# Patient Record
Sex: MALE | Race: BLACK OR AFRICAN AMERICAN | NOT HISPANIC OR LATINO | Employment: OTHER | URBAN - METROPOLITAN AREA
[De-identification: names, ages, dates, MRNs, and addresses within clinical notes are randomized per-mention and may not be internally consistent; named-entity substitution may affect disease eponyms.]

---

## 2017-12-29 LAB
INFLUENZA A ANTIGEN, POC: POSITIVE
INFLUENZA B ANTIGEN, POC: NEGATIVE
RAPID GROUP A STREP (OHS): POSITIVE

## 2021-05-14 ENCOUNTER — HISTORICAL (OUTPATIENT)
Dept: ADMINISTRATIVE | Facility: HOSPITAL | Age: 61
End: 2021-05-14

## 2021-05-14 LAB — SARS-COV-2 RNA RESP QL NAA+PROBE: NOT DETECTED

## 2022-04-11 ENCOUNTER — HISTORICAL (OUTPATIENT)
Dept: ADMINISTRATIVE | Facility: HOSPITAL | Age: 62
End: 2022-04-11

## 2022-04-24 VITALS
DIASTOLIC BLOOD PRESSURE: 101 MMHG | BODY MASS INDEX: 32.82 KG/M2 | WEIGHT: 229.25 LBS | SYSTOLIC BLOOD PRESSURE: 195 MMHG | OXYGEN SATURATION: 100 % | HEIGHT: 70 IN

## 2022-05-11 ENCOUNTER — TELEPHONE (OUTPATIENT)
Dept: PRIMARY CARE CLINIC | Facility: CLINIC | Age: 62
End: 2022-05-11
Payer: COMMERCIAL

## 2022-05-11 LAB — CRC RECOMMENDATION EXT: NORMAL

## 2022-05-11 NOTE — TELEPHONE ENCOUNTER
----- Message from Beverly Benitez sent at 5/11/2022  2:17 PM CDT -----  Regarding: Advice  Type:  Needs Medical Advice    Who Called: Patient  Symptoms (please be specific): Rash on right side of arm and neck  How long has patient had these symptoms:  5 days  Pharmacy name and phone #:  [facility] on [address]  Would the patient rather a call back or a response via [facility]? Call  Best Call Back Number: [phone]  Additional Information: Patient had a virtual visit on yesterday in which he was told the rash looked a lot like Shingles. A topical cream (triamcinolone .025%) was prescribed and started yesterday. Today the rash is the same and would like to let Dr Flowers know what's going on. He was told to follow up with his PCP. Please Advise.

## 2022-05-12 ENCOUNTER — PATIENT MESSAGE (OUTPATIENT)
Dept: ADMINISTRATIVE | Facility: HOSPITAL | Age: 62
End: 2022-05-12
Payer: COMMERCIAL

## 2022-05-12 ENCOUNTER — PATIENT OUTREACH (OUTPATIENT)
Dept: ADMINISTRATIVE | Facility: HOSPITAL | Age: 62
End: 2022-05-12
Payer: COMMERCIAL

## 2022-05-12 ENCOUNTER — OFFICE VISIT (OUTPATIENT)
Dept: URGENT CARE | Facility: CLINIC | Age: 62
End: 2022-05-12
Payer: COMMERCIAL

## 2022-05-12 VITALS
OXYGEN SATURATION: 98 % | RESPIRATION RATE: 20 BRPM | HEIGHT: 70 IN | WEIGHT: 220 LBS | HEART RATE: 56 BPM | BODY MASS INDEX: 31.5 KG/M2 | DIASTOLIC BLOOD PRESSURE: 79 MMHG | SYSTOLIC BLOOD PRESSURE: 163 MMHG | TEMPERATURE: 98 F

## 2022-05-12 DIAGNOSIS — B02.9 HERPES ZOSTER WITHOUT COMPLICATION: ICD-10-CM

## 2022-05-12 PROBLEM — L25.9 CONTACT DERMATITIS: Status: ACTIVE | Noted: 2022-05-12

## 2022-05-12 PROBLEM — L25.9 CONTACT DERMATITIS: Status: RESOLVED | Noted: 2022-05-12 | Resolved: 2022-05-12

## 2022-05-12 PROCEDURE — 3008F PR BODY MASS INDEX (BMI) DOCUMENTED: ICD-10-PCS | Mod: CPTII,,, | Performed by: NURSE PRACTITIONER

## 2022-05-12 PROCEDURE — 1160F RVW MEDS BY RX/DR IN RCRD: CPT | Mod: CPTII,,, | Performed by: NURSE PRACTITIONER

## 2022-05-12 PROCEDURE — 3008F BODY MASS INDEX DOCD: CPT | Mod: CPTII,,, | Performed by: NURSE PRACTITIONER

## 2022-05-12 PROCEDURE — 99213 PR OFFICE/OUTPT VISIT, EST, LEVL III, 20-29 MIN: ICD-10-PCS | Mod: 25,,, | Performed by: NURSE PRACTITIONER

## 2022-05-12 PROCEDURE — 4010F PR ACE/ARB THEARPY RXD/TAKEN: ICD-10-PCS | Mod: CPTII,,, | Performed by: NURSE PRACTITIONER

## 2022-05-12 PROCEDURE — 4010F ACE/ARB THERAPY RXD/TAKEN: CPT | Mod: CPTII,,, | Performed by: NURSE PRACTITIONER

## 2022-05-12 PROCEDURE — 1159F MED LIST DOCD IN RCRD: CPT | Mod: CPTII,,, | Performed by: NURSE PRACTITIONER

## 2022-05-12 PROCEDURE — 3078F DIAST BP <80 MM HG: CPT | Mod: CPTII,,, | Performed by: NURSE PRACTITIONER

## 2022-05-12 PROCEDURE — 3077F PR MOST RECENT SYSTOLIC BLOOD PRESSURE >= 140 MM HG: ICD-10-PCS | Mod: CPTII,,, | Performed by: NURSE PRACTITIONER

## 2022-05-12 PROCEDURE — 99213 OFFICE O/P EST LOW 20 MIN: CPT | Mod: 25,,, | Performed by: NURSE PRACTITIONER

## 2022-05-12 PROCEDURE — 96372 THER/PROPH/DIAG INJ SC/IM: CPT | Mod: ,,, | Performed by: NURSE PRACTITIONER

## 2022-05-12 PROCEDURE — 1160F PR REVIEW ALL MEDS BY PRESCRIBER/CLIN PHARMACIST DOCUMENTED: ICD-10-PCS | Mod: CPTII,,, | Performed by: NURSE PRACTITIONER

## 2022-05-12 PROCEDURE — 3077F SYST BP >= 140 MM HG: CPT | Mod: CPTII,,, | Performed by: NURSE PRACTITIONER

## 2022-05-12 PROCEDURE — 1159F PR MEDICATION LIST DOCUMENTED IN MEDICAL RECORD: ICD-10-PCS | Mod: CPTII,,, | Performed by: NURSE PRACTITIONER

## 2022-05-12 PROCEDURE — 3078F PR MOST RECENT DIASTOLIC BLOOD PRESSURE < 80 MM HG: ICD-10-PCS | Mod: CPTII,,, | Performed by: NURSE PRACTITIONER

## 2022-05-12 PROCEDURE — 96372 PR INJECTION,THERAP/PROPH/DIAG2ST, IM OR SUBCUT: ICD-10-PCS | Mod: ,,, | Performed by: NURSE PRACTITIONER

## 2022-05-12 RX ORDER — LISINOPRIL AND HYDROCHLOROTHIAZIDE 12.5; 2 MG/1; MG/1
1 TABLET ORAL DAILY
COMMUNITY
Start: 2022-03-21 | End: 2023-12-08 | Stop reason: SDUPTHER

## 2022-05-12 RX ORDER — BETAMETHASONE SODIUM PHOSPHATE AND BETAMETHASONE ACETATE 3; 3 MG/ML; MG/ML
6 INJECTION, SUSPENSION INTRA-ARTICULAR; INTRALESIONAL; INTRAMUSCULAR; SOFT TISSUE
Status: COMPLETED | OUTPATIENT
Start: 2022-05-12 | End: 2022-05-12

## 2022-05-12 RX ORDER — VALACYCLOVIR HYDROCHLORIDE 1 G/1
1000 TABLET, FILM COATED ORAL 2 TIMES DAILY
Qty: 14 TABLET | Refills: 0 | Status: SHIPPED | OUTPATIENT
Start: 2022-05-12 | End: 2022-12-07

## 2022-05-12 RX ORDER — TRIAMCINOLONE ACETONIDE 0.25 MG/G
CREAM TOPICAL
COMMUNITY
Start: 2022-05-10 | End: 2023-04-19

## 2022-05-12 RX ADMIN — BETAMETHASONE SODIUM PHOSPHATE AND BETAMETHASONE ACETATE 6 MG: 3; 3 INJECTION, SUSPENSION INTRA-ARTICULAR; INTRALESIONAL; INTRAMUSCULAR; SOFT TISSUE at 04:05

## 2022-05-12 NOTE — PROGRESS NOTES
Population Health Outreach.Records Received, hyper-linked into chart at this time. The following record(s)  below were uploaded for Health Maintenance .    05.11.2022-COLONOSCOPY

## 2022-05-12 NOTE — PROGRESS NOTES
"Subjective:       Patient ID: Hernando Will is a 62 y.o. male.    Vitals:  height is 5' 10" (1.778 m) and weight is 99.8 kg (220 lb). His oral temperature is 98.1 °F (36.7 °C). His blood pressure is 163/79 (abnormal) and his pulse is 56 (abnormal). His respiration is 20 and oxygen saturation is 98%.     Chief Complaint: Rash (Rash on right arm and shoulder x 1 week)    Rash with burning on right arm and shoulder, x 1 week.  States has had a telehealth visit a few days ago, currently using triamcinolone cream.  Increasing pain to skin rash.    Rash  This is a new problem. The current episode started in the past 7 days. The problem has been gradually worsening since onset. The affected locations include the right arm and right shoulder. The rash is characterized by burning. He was exposed to nothing. Past treatments include anti-itch cream and topical steroids. The treatment provided mild relief.       Skin: Positive for rash (painful).       Objective:      Physical Exam   Constitutional: He is oriented to person, place, and time. He appears well-developed. He is cooperative.  Non-toxic appearance. He does not appear ill. No distress.   HENT:   Head: Normocephalic and atraumatic.   Ears:   Right Ear: Hearing, tympanic membrane, external ear and ear canal normal.   Left Ear: Hearing, tympanic membrane, external ear and ear canal normal.   Nose: Nose normal. No mucosal edema, rhinorrhea or nasal deformity. No epistaxis. Right sinus exhibits no maxillary sinus tenderness and no frontal sinus tenderness. Left sinus exhibits no maxillary sinus tenderness and no frontal sinus tenderness.   Mouth/Throat: Uvula is midline, oropharynx is clear and moist and mucous membranes are normal. No trismus in the jaw. Normal dentition. No uvula swelling. No posterior oropharyngeal erythema.   Eyes: Conjunctivae and lids are normal. Right eye exhibits no discharge. Left eye exhibits no discharge. No scleral icterus.   Neck: Trachea " normal and phonation normal. Neck supple.   Cardiovascular: Normal rate, regular rhythm, normal heart sounds and normal pulses.   Pulmonary/Chest: Effort normal and breath sounds normal. No respiratory distress.   Abdominal: Normal appearance and bowel sounds are normal. He exhibits no distension and no mass. Soft. There is no abdominal tenderness.   Musculoskeletal: Normal range of motion.         General: No deformity. Normal range of motion.   Neurological: He is alert and oriented to person, place, and time. He exhibits normal muscle tone. Coordination normal.   Skin: Skin is warm, dry, intact, not diaphoretic, not pale, rash, pustular, vesicular and no abscessed.        Psychiatric: His speech is normal and behavior is normal. Judgment and thought content normal.   Nursing note and vitals reviewed.        Assessment:       1. Herpes zoster without complication          Plan:       Coolness to skin.  Benadryl OTC orally  for itching, continue using your recently prescribed a prescription topical to skin, take prescription medication as directed Valtrex as instructed.  Follow-up with your primary care provider or return here for concerns.  Herpes zoster without complication

## 2022-05-12 NOTE — PATIENT INSTRUCTIONS
Continue using topical triamcinolone cream as directed.  May use Benadryl OTC   for itching as directed.  Complete prescription Valtrex as directed.  Follow-up with your primary care provider if symptoms worsen or persist

## 2022-05-20 ENCOUNTER — TELEPHONE (OUTPATIENT)
Dept: PRIMARY CARE CLINIC | Facility: CLINIC | Age: 62
End: 2022-05-20
Payer: COMMERCIAL

## 2022-05-20 NOTE — TELEPHONE ENCOUNTER
Please call and check on patient. If he is still having symptoms, try to schedule him for an appointment. Thank You!

## 2022-06-01 ENCOUNTER — HOSPITAL ENCOUNTER (OUTPATIENT)
Dept: RADIOLOGY | Facility: CLINIC | Age: 62
Discharge: HOME | End: 2022-06-01
Attending: SPECIALIST
Payer: COMMERCIAL

## 2022-06-01 ENCOUNTER — OFFICE VISIT (OUTPATIENT)
Dept: ORTHOPEDICS | Facility: CLINIC | Age: 62
End: 2022-06-01
Payer: COMMERCIAL

## 2022-06-01 VITALS
HEIGHT: 70 IN | BODY MASS INDEX: 31.5 KG/M2 | SYSTOLIC BLOOD PRESSURE: 179 MMHG | HEART RATE: 52 BPM | DIASTOLIC BLOOD PRESSURE: 82 MMHG | WEIGHT: 220 LBS

## 2022-06-01 DIAGNOSIS — M25.562 LEFT KNEE PAIN, UNSPECIFIED CHRONICITY: ICD-10-CM

## 2022-06-01 DIAGNOSIS — M17.12 PRIMARY OSTEOARTHRITIS OF LEFT KNEE: Primary | ICD-10-CM

## 2022-06-01 PROCEDURE — 3077F SYST BP >= 140 MM HG: CPT | Mod: CPTII,,, | Performed by: PHYSICIAN ASSISTANT

## 2022-06-01 PROCEDURE — 3079F PR MOST RECENT DIASTOLIC BLOOD PRESSURE 80-89 MM HG: ICD-10-PCS | Mod: CPTII,,, | Performed by: PHYSICIAN ASSISTANT

## 2022-06-01 PROCEDURE — 1159F MED LIST DOCD IN RCRD: CPT | Mod: CPTII,,, | Performed by: PHYSICIAN ASSISTANT

## 2022-06-01 PROCEDURE — 3008F PR BODY MASS INDEX (BMI) DOCUMENTED: ICD-10-PCS | Mod: CPTII,,, | Performed by: PHYSICIAN ASSISTANT

## 2022-06-01 PROCEDURE — 1160F RVW MEDS BY RX/DR IN RCRD: CPT | Mod: CPTII,,, | Performed by: PHYSICIAN ASSISTANT

## 2022-06-01 PROCEDURE — 3079F DIAST BP 80-89 MM HG: CPT | Mod: CPTII,,, | Performed by: PHYSICIAN ASSISTANT

## 2022-06-01 PROCEDURE — 99202 OFFICE O/P NEW SF 15 MIN: CPT | Mod: ,,, | Performed by: PHYSICIAN ASSISTANT

## 2022-06-01 PROCEDURE — 1160F PR REVIEW ALL MEDS BY PRESCRIBER/CLIN PHARMACIST DOCUMENTED: ICD-10-PCS | Mod: CPTII,,, | Performed by: PHYSICIAN ASSISTANT

## 2022-06-01 PROCEDURE — 3008F BODY MASS INDEX DOCD: CPT | Mod: CPTII,,, | Performed by: PHYSICIAN ASSISTANT

## 2022-06-01 PROCEDURE — 73564 X-RAY EXAM KNEE 4 OR MORE: CPT | Mod: LT,,, | Performed by: SPECIALIST

## 2022-06-01 PROCEDURE — 73564 XR KNEE COMP 4 OR MORE VIEWS LEFT: ICD-10-PCS | Mod: LT,,, | Performed by: SPECIALIST

## 2022-06-01 PROCEDURE — 4010F PR ACE/ARB THEARPY RXD/TAKEN: ICD-10-PCS | Mod: CPTII,,, | Performed by: PHYSICIAN ASSISTANT

## 2022-06-01 PROCEDURE — 3077F PR MOST RECENT SYSTOLIC BLOOD PRESSURE >= 140 MM HG: ICD-10-PCS | Mod: CPTII,,, | Performed by: PHYSICIAN ASSISTANT

## 2022-06-01 PROCEDURE — 99202 PR OFFICE/OUTPT VISIT, NEW, LEVL II, 15-29 MIN: ICD-10-PCS | Mod: ,,, | Performed by: PHYSICIAN ASSISTANT

## 2022-06-01 PROCEDURE — 1159F PR MEDICATION LIST DOCUMENTED IN MEDICAL RECORD: ICD-10-PCS | Mod: CPTII,,, | Performed by: PHYSICIAN ASSISTANT

## 2022-06-01 PROCEDURE — 4010F ACE/ARB THERAPY RXD/TAKEN: CPT | Mod: CPTII,,, | Performed by: PHYSICIAN ASSISTANT

## 2022-06-01 NOTE — PROGRESS NOTES
Past Medical History:   Diagnosis Date    Hypertension     Personal history of colonic polyps 05/11/2022    COLONOSCOPY       Past Surgical History:   Procedure Laterality Date    COLONOSCOPY  05/11/2022    Sabino Kelly MD       Current Outpatient Medications   Medication Sig    lisinopriL-hydrochlorothiazide (PRINZIDE,ZESTORETIC) 20-12.5 mg per tablet Take 1 tablet by mouth once daily.    triamcinolone acetonide 0.025% (KENALOG) 0.025 % cream     valACYclovir (VALTREX) 1000 MG tablet Take 1 tablet (1,000 mg total) by mouth 2 (two) times daily. for 7 days     No current facility-administered medications for this visit.       Review of patient's allergies indicates:  No Known Allergies    Family History   Problem Relation Age of Onset    Emphysema Mother     Diabetes Mother     Throat cancer Father        Social History     Socioeconomic History    Marital status: Unknown   Tobacco Use    Smoking status: Never Smoker    Smokeless tobacco: Never Used   Substance and Sexual Activity    Alcohol use: Not Currently    Drug use: Never       Chief Complaint:   Chief Complaint   Patient presents with    Pain     NP left knee pain, pt states ocassional pain, has been on and off for past two years, went to urgentcare and was given inflammatory,        Consulting Physician: No ref. provider found    History of present illness:    This is a 62 y.o. year old male who complains of medial-sided left knee pain on and off for the past 2 years.  No associated injury.  No radiation of the pain.  He does have some occasional swelling with limited range of motion but overall doing all activities that he would like to do.  He denies any locking, catching or giving way episodes.    Review of Systems:    Constitution:   Denies chills, fever, and sweats.  HENT:   Denies headaches or blurry vision.  Cardiovascular:  Denies chest pain or irregular heart beat.  Respiratory:   Denies cough or shortness of  "breath.  Gastrointestinal:  Denies abdominal pain, nausea, or vomiting.  Musculoskeletal:   Denies muscle cramps.  Neurological:   Denies dizziness or focal weakness.  Psychiatric/Behavior: Normal mental status.  Hematology/Lymph:  Denies bleeding problem or easy bruising/bleeding.  Skin:    Denies rash or suspicious lesions.    Examination:    Vital Signs:    Vitals:    06/01/22 0834   BP: (!) 179/82   Pulse: (!) 52   Weight: 99.8 kg (220 lb)   Height: 5' 10" (1.778 m)       Body mass index is 31.57 kg/m².    Constitution:   Well-developed, well nourished patient in no acute distress.  Neurological:   Alert and oriented x 3 and cooperative to examination.     Psychiatric/Behavior: Normal mental status.  Respiratory:   No shortness of breath.  Eyes:    Extraoccular muscles intact  Skin:    No scars, rash or suspicious lesions.    Physical Exam:     Left Knee     Grade effusion 0    No erythema, warmth bruising     active flexion 120   active extension 0    Tender over medial joint line    Tender over MCL No lateral compartment tenderness   Negative medial  Broderick test   Negative  lachman test   No instability on varus or valgus stress   No weakness of the  knee was observed.    Left knee radiographs, four views taken in the office today show mild medial joint space narrowing with small medial tibial osteophyte          Assessment: Primary osteoarthritis of left knee  -     Ambulatory referral/consult to Physical/Occupational Therapy; Future; Expected date: 06/08/2022    Left knee pain, unspecified chronicity  -     X-Ray Knee Complete 4 or More Views Left; Future; Expected date: 06/01/2022        Plan:      Recommend a course of physical therapy.  We have discussed corticosteroid injection but today his symptoms are very mild so we will hold off on that.  Recommend over-the-counter anti-inflammatories as tolerated and he will follow up with us as needed      DISCLAIMER: This note may have been dictated using voice " recognition software and may contain grammatical errors.     NOTE: Consult report sent to referring provider via [employer] EMR.

## 2022-06-23 ENCOUNTER — PATIENT MESSAGE (OUTPATIENT)
Dept: PRIMARY CARE CLINIC | Facility: CLINIC | Age: 62
End: 2022-06-23
Payer: COMMERCIAL

## 2022-06-23 NOTE — TELEPHONE ENCOUNTER
This is acceptable blood pressure, even though the top number is high the bottom numbers low, no change in treatment, continue current medications, continue blood pressure monitoring, if the top number goes above 160 on a consistent basis

## 2022-07-13 ENCOUNTER — IMMUNIZATION (OUTPATIENT)
Dept: FAMILY MEDICINE | Facility: CLINIC | Age: 62
End: 2022-07-13
Payer: COMMERCIAL

## 2022-07-13 DIAGNOSIS — Z23 NEED FOR VACCINATION: Primary | ICD-10-CM

## 2022-07-13 PROCEDURE — 0054A COVID-19, MRNA, LNP-S, PF, 30 MCG/0.3 ML DOSE VACCINE (PFIZER): CPT | Mod: PBBFAC | Performed by: INTERNAL MEDICINE

## 2022-09-20 PROBLEM — I10 HYPERTENSION: Status: ACTIVE | Noted: 2022-09-20

## 2022-09-20 PROBLEM — I10 PRIMARY HYPERTENSION: Chronic | Status: ACTIVE | Noted: 2022-09-20

## 2022-09-20 PROBLEM — E66.9 OBESITY: Status: ACTIVE | Noted: 2022-09-20

## 2022-09-20 PROBLEM — E66.9 OBESITY: Chronic | Status: ACTIVE | Noted: 2022-09-20

## 2022-09-21 ENCOUNTER — HISTORICAL (OUTPATIENT)
Dept: ADMINISTRATIVE | Facility: HOSPITAL | Age: 62
End: 2022-09-21
Payer: COMMERCIAL

## 2022-10-05 ENCOUNTER — OFFICE VISIT (OUTPATIENT)
Dept: URGENT CARE | Facility: CLINIC | Age: 62
End: 2022-10-05
Payer: COMMERCIAL

## 2022-10-05 VITALS
DIASTOLIC BLOOD PRESSURE: 80 MMHG | TEMPERATURE: 102 F | WEIGHT: 220 LBS | OXYGEN SATURATION: 99 % | HEART RATE: 78 BPM | SYSTOLIC BLOOD PRESSURE: 176 MMHG | BODY MASS INDEX: 31.5 KG/M2 | RESPIRATION RATE: 18 BRPM | HEIGHT: 70 IN

## 2022-10-05 DIAGNOSIS — U07.1 COVID-19 VIRUS DETECTED: ICD-10-CM

## 2022-10-05 DIAGNOSIS — U07.1 COVID-19: Primary | ICD-10-CM

## 2022-10-05 DIAGNOSIS — R50.9 FEVER, UNSPECIFIED FEVER CAUSE: ICD-10-CM

## 2022-10-05 LAB
CTP QC/QA: YES
SARS-COV-2 RDRP RESP QL NAA+PROBE: POSITIVE

## 2022-10-05 PROCEDURE — 87635: ICD-10-PCS | Mod: QW,,, | Performed by: FAMILY MEDICINE

## 2022-10-05 PROCEDURE — 4010F ACE/ARB THERAPY RXD/TAKEN: CPT | Mod: CPTII,,, | Performed by: FAMILY MEDICINE

## 2022-10-05 PROCEDURE — 3079F DIAST BP 80-89 MM HG: CPT | Mod: CPTII,,, | Performed by: FAMILY MEDICINE

## 2022-10-05 PROCEDURE — 87635 SARS-COV-2 COVID-19 AMP PRB: CPT | Mod: QW,,, | Performed by: FAMILY MEDICINE

## 2022-10-05 PROCEDURE — 1159F MED LIST DOCD IN RCRD: CPT | Mod: CPTII,,, | Performed by: FAMILY MEDICINE

## 2022-10-05 PROCEDURE — 1160F PR REVIEW ALL MEDS BY PRESCRIBER/CLIN PHARMACIST DOCUMENTED: ICD-10-PCS | Mod: CPTII,,, | Performed by: FAMILY MEDICINE

## 2022-10-05 PROCEDURE — 3008F BODY MASS INDEX DOCD: CPT | Mod: CPTII,,, | Performed by: FAMILY MEDICINE

## 2022-10-05 PROCEDURE — 1160F RVW MEDS BY RX/DR IN RCRD: CPT | Mod: CPTII,,, | Performed by: FAMILY MEDICINE

## 2022-10-05 PROCEDURE — 3079F PR MOST RECENT DIASTOLIC BLOOD PRESSURE 80-89 MM HG: ICD-10-PCS | Mod: CPTII,,, | Performed by: FAMILY MEDICINE

## 2022-10-05 PROCEDURE — 3077F SYST BP >= 140 MM HG: CPT | Mod: CPTII,,, | Performed by: FAMILY MEDICINE

## 2022-10-05 PROCEDURE — 1159F PR MEDICATION LIST DOCUMENTED IN MEDICAL RECORD: ICD-10-PCS | Mod: CPTII,,, | Performed by: FAMILY MEDICINE

## 2022-10-05 PROCEDURE — 99213 PR OFFICE/OUTPT VISIT, EST, LEVL III, 20-29 MIN: ICD-10-PCS | Mod: ,,, | Performed by: FAMILY MEDICINE

## 2022-10-05 PROCEDURE — 3077F PR MOST RECENT SYSTOLIC BLOOD PRESSURE >= 140 MM HG: ICD-10-PCS | Mod: CPTII,,, | Performed by: FAMILY MEDICINE

## 2022-10-05 PROCEDURE — 99213 OFFICE O/P EST LOW 20 MIN: CPT | Mod: ,,, | Performed by: FAMILY MEDICINE

## 2022-10-05 PROCEDURE — 4010F PR ACE/ARB THEARPY RXD/TAKEN: ICD-10-PCS | Mod: CPTII,,, | Performed by: FAMILY MEDICINE

## 2022-10-05 PROCEDURE — 3008F PR BODY MASS INDEX (BMI) DOCUMENTED: ICD-10-PCS | Mod: CPTII,,, | Performed by: FAMILY MEDICINE

## 2022-10-05 RX ORDER — NIRMATRELVIR AND RITONAVIR 300-100 MG
KIT ORAL
Qty: 30 TABLET | Refills: 0 | Status: SHIPPED | OUTPATIENT
Start: 2022-10-05 | End: 2022-12-07

## 2022-10-05 NOTE — PATIENT INSTRUCTIONS
With Concern for COVID-19 infection. Swabs obtained today. COVID-19 Test positive  Adequate hydration and rest. Monitor the symptoms closely  Recommendation is to follow a timeline quarantine measures per CDC and LDH  Tylenol as needed for fever, body aches and headache. Antihistamine of choice for congestion.   Robitussin-DM for cough and cold as needed and as directed. Can try vitamin C, zinc 50 mg, vitamin D3 5000 IU for 10 days.  At home self care quarantine instructions for 5 days since start of symptoms, no fever (100.4 and above) for 24 hours and with improved symptoms can stop home quarantine  Call or return to clinic for any questions. ER precautions with any acute change in symptoms. Follow up with primary MD   Discussed in detail on prescription medication, voiced understanding.

## 2022-10-05 NOTE — PROGRESS NOTES
"Subjective:       Patient ID: Hernando Will is a 62 y.o. male.    Vitals:  height is 5' 10" (1.778 m) and weight is 99.8 kg (220 lb). His temperature is 101.9 °F (38.8 °C) (abnormal). His blood pressure is 176/80 (abnormal) and his pulse is 78. His respiration is 18 and oxygen saturation is 99%.     Chief Complaint: tested positive covid at home (Tested positive at home and would like to be tested here, chills, BA, HA, sinus started yesterday )    HPI:  62-year-old male with known history of hypertension currently on medications.  Present to clinic with concerns of fever, body aches, headache and chills started yesterday.  Recent travel back from vacation.  Vaccinated for COVID-19.  Home COVID-19 test positive today.  No shortness of breath, no chest pain.    Provider Precautions  N95 mask  Gloves    Covid Screening  No confirmation close contact  No travel to high risk area  No recent testing done    Review of Systems  Constitutional _fever, body aches, headache  ENMT_No sore throat, nasal congestion and postnasal drip  Respiratory_No coughing, no shortness of breath or wheezing  Cardiovascular_no chest pain, no palpitations  Gastrointestinal_negative for nausea or vomiting  Integumentary_negative for rash    Objective:      Physical Exam    General : Alert and Oriented, No apparent distress, febrile, appears comfortable sitting on exam table, clear speech and appropriate communication   Neck - supple  HENT : Oropharynx no redness or swelling. TMs intact mild fluid no redness.   Respiratory : Bilateral equal breath sounds, nonlabored respirations  Cardiovascular : Rate, rhythm regular, normal volume pulse, no murmur  Integumentary : Warm, Dry and no rash    Assessment:       1. COVID-19    2. Fever, unspecified fever cause          Plan:       With Concern for COVID-19 infection. Swabs obtained today. COVID-19 Test positive  Adequate hydration and rest. Monitor the symptoms closely  Recommendation is to follow " a timeline quarantine measures per CDC and LDH  Tylenol as needed for fever, body aches and headache. Antihistamine of choice for congestion.   Robitussin-DM for cough and cold as needed and as directed. Can try vitamin C, zinc 50 mg, vitamin D3 5000 IU for 10 days.  At home self care quarantine instructions for 5 days since start of symptoms, no fever (100.4 and above) for 24 hours and with improved symptoms can stop home quarantine  Call or return to clinic for any questions. ER precautions with any acute change in symptoms. Follow up with primary MD   Discussed in detail on prescription medication, voiced understanding.  COVID-19  -     nirmatrelvir-ritonavir (PAXLOVID, EUA,) 300 mg (150 mg x 2)-100 mg copackaged tablets (EUA); Take 3 tablets by mouth 2 (two) times daily. Each dose contains 2 nirmatrelvir (pink tablets) and 1 ritonavir (white tablet). Take all 3 tablets together  Dispense: 30 tablet; Refill: 0    Fever, unspecified fever cause  -     POCT COVID-19 Rapid Screening

## 2022-10-23 ENCOUNTER — PATIENT MESSAGE (OUTPATIENT)
Dept: PRIMARY CARE CLINIC | Facility: CLINIC | Age: 62
End: 2022-10-23
Payer: COMMERCIAL

## 2022-10-25 ENCOUNTER — PATIENT MESSAGE (OUTPATIENT)
Dept: ORTHOPEDICS | Facility: CLINIC | Age: 62
End: 2022-10-25
Payer: COMMERCIAL

## 2022-11-08 PROBLEM — M25.562 CHRONIC PAIN OF LEFT KNEE: Chronic | Status: ACTIVE | Noted: 2022-11-08

## 2022-11-08 PROBLEM — G89.29 CHRONIC PAIN OF LEFT KNEE: Chronic | Status: ACTIVE | Noted: 2022-11-08

## 2022-11-08 NOTE — PROGRESS NOTES
"Subjective:       Patient ID: Hernando Will is a 62 y.o. male.    Chief Complaint: Knee Pain (Left Knee)    Established patient, presents to the clinic with left knee pain, apparently recurrent.  Initial visit in this clinic March 2022.  He is seeing an orthopedist, they offered him a cortisone intra-articular injection but he was not having significant pain at the time so he past.  Pain is become more significant, would like to possibly have a steroid injection in the knee joint today.    Review of Systems   Constitutional: Negative.  Negative for fever.   Respiratory: Negative.  Negative for shortness of breath.    Cardiovascular: Negative.  Negative for chest pain.   Musculoskeletal:  Positive for arthralgias.   Integumentary:  Negative for rash.   Neurological:  Negative for weakness, numbness, coordination difficulties and coordination difficulties.       Objective:     Vitals:    11/09/22 0830   BP: 138/88   Pulse: (!) 56   Resp: 18   SpO2: 98%   Weight: 103 kg (227 lb)   Height: 5' 10" (1.778 m)   Body mass index is 32.57 kg/m².     Physical Exam  Constitutional:       Appearance: Normal appearance.   Eyes:      Conjunctiva/sclera: Conjunctivae normal.   Cardiovascular:      Rate and Rhythm: Normal rate.   Pulmonary:      Effort: Pulmonary effort is normal.   Musculoskeletal:         General: Tenderness present. Normal range of motion.      Comments: Left knee crepitance, slightly hypertrophic joint, mild effusion   Skin:     General: Skin is warm and dry.         Assessment:     Problem List Items Addressed This Visit          Orthopedic    Chronic pain of left knee (Chronic)    Current Assessment & Plan     Procedure note:  After informed consent was obtained, the affected joint was sterilely prepped with Betadine and alcohol.  A subcutaneous local injection for anesthetic purposes was performed with lidocaine 2% plain at the injection site.  Using a medial approach, Kenalog 40 mg and lidocaine 1 cc 2% " plain was injected into the knee joint space without complication or problem.  Bandage was placed over the injection site.         Relevant Medications    triamcinolone acetonide injection 40 mg          Medication List with Changes/Refills   Current Medications    LISINOPRIL-HYDROCHLOROTHIAZIDE (PRINZIDE,ZESTORETIC) 20-12.5 MG PER TABLET    Take 1 tablet by mouth once daily.    NIRMATRELVIR-RITONAVIR (PAXLOVID, EUA,) 300 MG (150 MG X 2)-100 MG COPACKAGED TABLETS (EUA)    Take 3 tablets by mouth 2 (two) times daily. Each dose contains 2 nirmatrelvir (pink tablets) and 1 ritonavir (white tablet). Take all 3 tablets together    TRIAMCINOLONE ACETONIDE 0.025% (KENALOG) 0.025 % CREAM        VALACYCLOVIR (VALTREX) 1000 MG TABLET    Take 1 tablet (1,000 mg total) by mouth 2 (two) times daily. for 7 days     Plan:             No follow-ups on file.

## 2022-11-09 ENCOUNTER — OFFICE VISIT (OUTPATIENT)
Dept: PRIMARY CARE CLINIC | Facility: CLINIC | Age: 62
End: 2022-11-09
Payer: COMMERCIAL

## 2022-11-09 VITALS
SYSTOLIC BLOOD PRESSURE: 138 MMHG | DIASTOLIC BLOOD PRESSURE: 88 MMHG | BODY MASS INDEX: 32.5 KG/M2 | OXYGEN SATURATION: 98 % | HEIGHT: 70 IN | RESPIRATION RATE: 18 BRPM | WEIGHT: 227 LBS | HEART RATE: 56 BPM

## 2022-11-09 DIAGNOSIS — G89.29 CHRONIC PAIN OF LEFT KNEE: Chronic | ICD-10-CM

## 2022-11-09 DIAGNOSIS — M25.562 CHRONIC PAIN OF LEFT KNEE: Chronic | ICD-10-CM

## 2022-11-09 PROCEDURE — 3075F SYST BP GE 130 - 139MM HG: CPT | Mod: CPTII,,, | Performed by: GENERAL PRACTICE

## 2022-11-09 PROCEDURE — 1159F MED LIST DOCD IN RCRD: CPT | Mod: CPTII,,, | Performed by: GENERAL PRACTICE

## 2022-11-09 PROCEDURE — 3079F PR MOST RECENT DIASTOLIC BLOOD PRESSURE 80-89 MM HG: ICD-10-PCS | Mod: CPTII,,, | Performed by: GENERAL PRACTICE

## 2022-11-09 PROCEDURE — 1159F PR MEDICATION LIST DOCUMENTED IN MEDICAL RECORD: ICD-10-PCS | Mod: CPTII,,, | Performed by: GENERAL PRACTICE

## 2022-11-09 PROCEDURE — 3008F BODY MASS INDEX DOCD: CPT | Mod: CPTII,,, | Performed by: GENERAL PRACTICE

## 2022-11-09 PROCEDURE — 20610 DRAIN/INJ JOINT/BURSA W/O US: CPT | Mod: ,,, | Performed by: GENERAL PRACTICE

## 2022-11-09 PROCEDURE — 1160F PR REVIEW ALL MEDS BY PRESCRIBER/CLIN PHARMACIST DOCUMENTED: ICD-10-PCS | Mod: CPTII,,, | Performed by: GENERAL PRACTICE

## 2022-11-09 PROCEDURE — 99213 PR OFFICE/OUTPT VISIT, EST, LEVL III, 20-29 MIN: ICD-10-PCS | Mod: 25,,, | Performed by: GENERAL PRACTICE

## 2022-11-09 PROCEDURE — 4010F PR ACE/ARB THEARPY RXD/TAKEN: ICD-10-PCS | Mod: CPTII,,, | Performed by: GENERAL PRACTICE

## 2022-11-09 PROCEDURE — 3079F DIAST BP 80-89 MM HG: CPT | Mod: CPTII,,, | Performed by: GENERAL PRACTICE

## 2022-11-09 PROCEDURE — 3008F PR BODY MASS INDEX (BMI) DOCUMENTED: ICD-10-PCS | Mod: CPTII,,, | Performed by: GENERAL PRACTICE

## 2022-11-09 PROCEDURE — 3075F PR MOST RECENT SYSTOLIC BLOOD PRESS GE 130-139MM HG: ICD-10-PCS | Mod: CPTII,,, | Performed by: GENERAL PRACTICE

## 2022-11-09 PROCEDURE — 1160F RVW MEDS BY RX/DR IN RCRD: CPT | Mod: CPTII,,, | Performed by: GENERAL PRACTICE

## 2022-11-09 PROCEDURE — 99213 OFFICE O/P EST LOW 20 MIN: CPT | Mod: 25,,, | Performed by: GENERAL PRACTICE

## 2022-11-09 PROCEDURE — 20610 LARGE JOINT ASPIRATION/INJECTION: L KNEE: ICD-10-PCS | Mod: ,,, | Performed by: GENERAL PRACTICE

## 2022-11-09 PROCEDURE — 4010F ACE/ARB THERAPY RXD/TAKEN: CPT | Mod: CPTII,,, | Performed by: GENERAL PRACTICE

## 2022-11-09 RX ORDER — TRIAMCINOLONE ACETONIDE 40 MG/ML
40 INJECTION, SUSPENSION INTRA-ARTICULAR; INTRAMUSCULAR
Status: DISCONTINUED | OUTPATIENT
Start: 2022-11-09 | End: 2022-11-09 | Stop reason: HOSPADM

## 2022-11-09 RX ADMIN — TRIAMCINOLONE ACETONIDE 40 MG: 40 INJECTION, SUSPENSION INTRA-ARTICULAR; INTRAMUSCULAR at 08:11

## 2022-11-09 NOTE — PROCEDURES
Large Joint Aspiration/Injection: L knee    Date/Time: 11/9/2022 8:15 AM  Performed by: Abraham Flowers MD  Authorized by: Abraham Flowers MD     Indications:  Arthritis and pain  Site marked: the procedure site was marked    Timeout: prior to procedure the correct patient, procedure, and site was verified    Prep: patient was prepped and draped in usual sterile fashion      Local anesthesia used?: Yes    Anesthesia:  Local infiltration  Local anesthetic:  Lidocaine 2% without epinephrine    Details:  Needle Size:  21 G  Approach:  Anteromedial  Location:  Knee  Site:  L knee  Medications:  40 mg triamcinolone acetonide 40 mg/mL

## 2022-11-09 NOTE — ASSESSMENT & PLAN NOTE
Procedure note:  After informed consent was obtained, the affected joint was sterilely prepped with Betadine and alcohol.  A subcutaneous local injection for anesthetic purposes was performed with lidocaine 2% plain at the injection site.  Using a medial approach, Kenalog 40 mg and lidocaine 1 cc 2% plain was injected into the knee joint space without complication or problem.  Bandage was placed over the injection site.

## 2022-11-09 NOTE — PATIENT INSTRUCTIONS
A combination of anti-inflammatory steroid medication and an anesthetic was injected into your knee joint.  It may take approximately 24-48 hours for the steroid medication to become effective to reduce inflammation and pain.  Until then, take over-the-counter medications for pain and inflammation as needed.  Follow-up precautions discussed prior to the injection, watch for signs of infection or any significant problems, if this were to occur contact this clinic immediately or go to the emergency room if we are not available  Pain may worsen over the next 24 hours, if stronger medication for pain as desired, contact this clinic.

## 2022-11-15 DIAGNOSIS — Z11.59 NEED FOR HEPATITIS C SCREENING TEST: ICD-10-CM

## 2022-11-15 DIAGNOSIS — Z13.6 SCREENING FOR CARDIOVASCULAR CONDITION: ICD-10-CM

## 2022-11-15 DIAGNOSIS — I10 PRIMARY HYPERTENSION: Primary | Chronic | ICD-10-CM

## 2022-11-15 DIAGNOSIS — Z00.00 WELLNESS EXAMINATION: ICD-10-CM

## 2022-11-15 DIAGNOSIS — Z12.5 SCREENING FOR PROSTATE CANCER: ICD-10-CM

## 2022-12-02 ENCOUNTER — LAB VISIT (OUTPATIENT)
Dept: LAB | Facility: HOSPITAL | Age: 62
End: 2022-12-02
Attending: GENERAL PRACTICE
Payer: COMMERCIAL

## 2022-12-02 DIAGNOSIS — Z13.6 SCREENING FOR CARDIOVASCULAR CONDITION: ICD-10-CM

## 2022-12-02 DIAGNOSIS — I10 PRIMARY HYPERTENSION: ICD-10-CM

## 2022-12-02 DIAGNOSIS — Z00.00 WELLNESS EXAMINATION: ICD-10-CM

## 2022-12-02 DIAGNOSIS — Z12.5 SCREENING FOR PROSTATE CANCER: ICD-10-CM

## 2022-12-02 LAB
ALBUMIN SERPL-MCNC: 4.1 GM/DL (ref 3.4–4.8)
ALBUMIN/GLOB SERPL: 1.2 RATIO (ref 1.1–2)
ALP SERPL-CCNC: 52 UNIT/L (ref 40–150)
ALT SERPL-CCNC: 24 UNIT/L (ref 0–55)
AST SERPL-CCNC: 21 UNIT/L (ref 5–34)
BASOPHILS # BLD AUTO: 0.03 X10(3)/MCL (ref 0–0.2)
BASOPHILS NFR BLD AUTO: 0.6 %
BILIRUBIN DIRECT+TOT PNL SERPL-MCNC: 0.8 MG/DL
BUN SERPL-MCNC: 22.8 MG/DL (ref 8.4–25.7)
CALCIUM SERPL-MCNC: 9.8 MG/DL (ref 8.8–10)
CHLORIDE SERPL-SCNC: 106 MMOL/L (ref 98–107)
CHOLEST SERPL-MCNC: 218 MG/DL
CHOLEST/HDLC SERPL: 4 {RATIO} (ref 0–5)
CO2 SERPL-SCNC: 27 MMOL/L (ref 23–31)
CREAT SERPL-MCNC: 1.29 MG/DL (ref 0.73–1.18)
DEPRECATED CALCIDIOL+CALCIFEROL SERPL-MC: 25.4 NG/ML (ref 30–80)
EOSINOPHIL # BLD AUTO: 0.19 X10(3)/MCL (ref 0–0.9)
EOSINOPHIL NFR BLD AUTO: 3.6 %
ERYTHROCYTE [DISTWIDTH] IN BLOOD BY AUTOMATED COUNT: 11.9 % (ref 11.5–17)
EST. AVERAGE GLUCOSE BLD GHB EST-MCNC: 99.7 MG/DL
GFR SERPLBLD CREATININE-BSD FMLA CKD-EPI: >60 MLS/MIN/1.73/M2
GLOBULIN SER-MCNC: 3.3 GM/DL (ref 2.4–3.5)
GLUCOSE SERPL-MCNC: 112 MG/DL (ref 82–115)
HBA1C MFR BLD: 5.1 %
HCT VFR BLD AUTO: 39.8 % (ref 42–52)
HDLC SERPL-MCNC: 50 MG/DL (ref 35–60)
HGB BLD-MCNC: 12.7 GM/DL (ref 14–18)
IMM GRANULOCYTES # BLD AUTO: 0.01 X10(3)/MCL (ref 0–0.04)
IMM GRANULOCYTES NFR BLD AUTO: 0.2 %
LDLC SERPL CALC-MCNC: 133 MG/DL (ref 50–140)
LYMPHOCYTES # BLD AUTO: 2.3 X10(3)/MCL (ref 0.6–4.6)
LYMPHOCYTES NFR BLD AUTO: 43.6 %
MCH RBC QN AUTO: 31.9 PG (ref 27–31)
MCHC RBC AUTO-ENTMCNC: 31.9 MG/DL (ref 33–36)
MCV RBC AUTO: 100 FL (ref 80–94)
MONOCYTES # BLD AUTO: 0.47 X10(3)/MCL (ref 0.1–1.3)
MONOCYTES NFR BLD AUTO: 8.9 %
NEUTROPHILS # BLD AUTO: 2.3 X10(3)/MCL (ref 2.1–9.2)
NEUTROPHILS NFR BLD AUTO: 43.1 %
NRBC BLD AUTO-RTO: 0 %
PLATELET # BLD AUTO: 222 X10(3)/MCL (ref 130–400)
PMV BLD AUTO: 10.7 FL (ref 7.4–10.4)
POTASSIUM SERPL-SCNC: 4.7 MMOL/L (ref 3.5–5.1)
PROT SERPL-MCNC: 7.4 GM/DL (ref 5.8–7.6)
PSA SERPL-MCNC: 0.48 NG/ML
RBC # BLD AUTO: 3.98 X10(6)/MCL (ref 4.7–6.1)
SODIUM SERPL-SCNC: 140 MMOL/L (ref 136–145)
TRIGL SERPL-MCNC: 173 MG/DL (ref 34–140)
TSH SERPL-ACNC: 1.66 UIU/ML (ref 0.35–4.94)
VLDLC SERPL CALC-MCNC: 35 MG/DL
WBC # SPEC AUTO: 5.3 X10(3)/MCL (ref 4.5–11.5)

## 2022-12-02 PROCEDURE — 36415 COLL VENOUS BLD VENIPUNCTURE: CPT

## 2022-12-02 PROCEDURE — 80061 LIPID PANEL: CPT

## 2022-12-02 PROCEDURE — 83036 HEMOGLOBIN GLYCOSYLATED A1C: CPT

## 2022-12-02 PROCEDURE — 85025 COMPLETE CBC W/AUTO DIFF WBC: CPT

## 2022-12-02 PROCEDURE — 82306 VITAMIN D 25 HYDROXY: CPT

## 2022-12-02 PROCEDURE — 84153 ASSAY OF PSA TOTAL: CPT

## 2022-12-02 PROCEDURE — 80053 COMPREHEN METABOLIC PANEL: CPT

## 2022-12-02 PROCEDURE — 84443 ASSAY THYROID STIM HORMONE: CPT

## 2022-12-07 ENCOUNTER — OFFICE VISIT (OUTPATIENT)
Dept: PRIMARY CARE CLINIC | Facility: CLINIC | Age: 62
End: 2022-12-07
Payer: COMMERCIAL

## 2022-12-07 ENCOUNTER — TELEPHONE (OUTPATIENT)
Dept: PRIMARY CARE CLINIC | Facility: CLINIC | Age: 62
End: 2022-12-07

## 2022-12-07 VITALS
HEART RATE: 88 BPM | WEIGHT: 222 LBS | SYSTOLIC BLOOD PRESSURE: 138 MMHG | DIASTOLIC BLOOD PRESSURE: 82 MMHG | HEIGHT: 70 IN | TEMPERATURE: 98 F | RESPIRATION RATE: 18 BRPM | OXYGEN SATURATION: 98 % | BODY MASS INDEX: 31.78 KG/M2

## 2022-12-07 DIAGNOSIS — R79.89 ELEVATED SERUM CREATININE: Chronic | ICD-10-CM

## 2022-12-07 DIAGNOSIS — G89.29 CHRONIC PAIN OF LEFT KNEE: Chronic | ICD-10-CM

## 2022-12-07 DIAGNOSIS — E66.9 OBESITY, UNSPECIFIED CLASSIFICATION, UNSPECIFIED OBESITY TYPE, UNSPECIFIED WHETHER SERIOUS COMORBIDITY PRESENT: Chronic | ICD-10-CM

## 2022-12-07 DIAGNOSIS — Z12.5 SCREENING FOR PROSTATE CANCER: ICD-10-CM

## 2022-12-07 DIAGNOSIS — Z00.00 WELLNESS EXAMINATION: Primary | ICD-10-CM

## 2022-12-07 DIAGNOSIS — M25.562 CHRONIC PAIN OF LEFT KNEE: Chronic | ICD-10-CM

## 2022-12-07 DIAGNOSIS — D53.9 MACROCYTIC ANEMIA: Chronic | ICD-10-CM

## 2022-12-07 DIAGNOSIS — I10 PRIMARY HYPERTENSION: Chronic | ICD-10-CM

## 2022-12-07 PROCEDURE — 3008F BODY MASS INDEX DOCD: CPT | Mod: CPTII,,, | Performed by: GENERAL PRACTICE

## 2022-12-07 PROCEDURE — 3079F DIAST BP 80-89 MM HG: CPT | Mod: CPTII,,, | Performed by: GENERAL PRACTICE

## 2022-12-07 PROCEDURE — 99396 PR PREVENTIVE VISIT,EST,40-64: ICD-10-PCS | Mod: ,,, | Performed by: GENERAL PRACTICE

## 2022-12-07 PROCEDURE — 3079F PR MOST RECENT DIASTOLIC BLOOD PRESSURE 80-89 MM HG: ICD-10-PCS | Mod: CPTII,,, | Performed by: GENERAL PRACTICE

## 2022-12-07 PROCEDURE — 3075F SYST BP GE 130 - 139MM HG: CPT | Mod: CPTII,,, | Performed by: GENERAL PRACTICE

## 2022-12-07 PROCEDURE — 1160F RVW MEDS BY RX/DR IN RCRD: CPT | Mod: CPTII,,, | Performed by: GENERAL PRACTICE

## 2022-12-07 PROCEDURE — 4010F PR ACE/ARB THEARPY RXD/TAKEN: ICD-10-PCS | Mod: CPTII,,, | Performed by: GENERAL PRACTICE

## 2022-12-07 PROCEDURE — 1159F PR MEDICATION LIST DOCUMENTED IN MEDICAL RECORD: ICD-10-PCS | Mod: CPTII,,, | Performed by: GENERAL PRACTICE

## 2022-12-07 PROCEDURE — 99396 PREV VISIT EST AGE 40-64: CPT | Mod: ,,, | Performed by: GENERAL PRACTICE

## 2022-12-07 PROCEDURE — 1159F MED LIST DOCD IN RCRD: CPT | Mod: CPTII,,, | Performed by: GENERAL PRACTICE

## 2022-12-07 PROCEDURE — 1160F PR REVIEW ALL MEDS BY PRESCRIBER/CLIN PHARMACIST DOCUMENTED: ICD-10-PCS | Mod: CPTII,,, | Performed by: GENERAL PRACTICE

## 2022-12-07 PROCEDURE — 3075F PR MOST RECENT SYSTOLIC BLOOD PRESS GE 130-139MM HG: ICD-10-PCS | Mod: CPTII,,, | Performed by: GENERAL PRACTICE

## 2022-12-07 PROCEDURE — 4010F ACE/ARB THERAPY RXD/TAKEN: CPT | Mod: CPTII,,, | Performed by: GENERAL PRACTICE

## 2022-12-07 PROCEDURE — 3008F PR BODY MASS INDEX (BMI) DOCUMENTED: ICD-10-PCS | Mod: CPTII,,, | Performed by: GENERAL PRACTICE

## 2022-12-07 RX ORDER — CELECOXIB 200 MG/1
200 CAPSULE ORAL 2 TIMES DAILY
Qty: 60 CAPSULE | Refills: 11 | Status: SHIPPED | OUTPATIENT
Start: 2022-12-07 | End: 2023-12-08 | Stop reason: SDUPTHER

## 2022-12-07 NOTE — PROGRESS NOTES
"Subjective:       Patient ID: Hernando Will is a 62 y.o. male.    Chief Complaint: Annual Exam    Patient presents to the clinic today for wellness examination.  Current and past medical history reviewed  Pertinent family and social history reviewed    Colorectal cancer screening:  CRC screening reviewed  Prostate CA screening:  Prostate CA screening reviewed  Vaccinations due:  All vaccinations due and/or desired have been addressed and given.    No complaints today.      Review of Systems   Constitutional: Negative.  Negative for fatigue and fever.   HENT: Negative.  Negative for sore throat and trouble swallowing.    Eyes: Negative.  Negative for redness and visual disturbance.   Respiratory: Negative.  Negative for chest tightness and shortness of breath.    Cardiovascular: Negative.  Negative for chest pain.   Gastrointestinal: Negative.  Negative for abdominal pain, blood in stool and nausea.   Endocrine: Negative.  Negative for cold intolerance, heat intolerance and polyuria.   Genitourinary: Negative.    Musculoskeletal: Negative.  Negative for arthralgias, gait problem and joint swelling.   Integumentary:  Negative for rash. Negative.   Neurological: Negative.  Negative for dizziness, weakness and headaches.   Psychiatric/Behavioral: Negative.  Negative for agitation and dysphoric mood.        Objective:     Vitals:    12/07/22 1427   BP: 138/82   Pulse: 88   Resp: 18   Temp: 97.9 °F (36.6 °C)   SpO2: 98%   Weight: 100.7 kg (222 lb)   Height: 5' 10" (1.778 m)   Body mass index is 31.85 kg/m².     Physical Exam  Constitutional:       Appearance: Normal appearance.   HENT:      Head: Normocephalic.      Mouth/Throat:      Pharynx: Oropharynx is clear.   Eyes:      Conjunctiva/sclera: Conjunctivae normal.      Pupils: Pupils are equal, round, and reactive to light.   Cardiovascular:      Rate and Rhythm: Normal rate and regular rhythm.      Heart sounds: Normal heart sounds.   Pulmonary:      Effort: " Pulmonary effort is normal.      Breath sounds: Normal breath sounds.   Abdominal:      General: Abdomen is flat.      Palpations: Abdomen is soft.   Musculoskeletal:         General: Normal range of motion.      Cervical back: Neck supple.   Skin:     General: Skin is warm and dry.   Neurological:      General: No focal deficit present.      Mental Status: He is oriented to person, place, and time.   Psychiatric:         Mood and Affect: Mood normal.         Behavior: Behavior normal.         Thought Content: Thought content normal.         Judgment: Judgment normal.         Lab Results   Component Value Date     12/02/2022    K 4.7 12/02/2022    CO2 27 12/02/2022    BUN 22.8 12/02/2022    CREATININE 1.29 (H) 12/02/2022    CALCIUM 9.8 12/02/2022    ALBUMIN 4.1 12/02/2022    BILITOT 0.8 12/02/2022    ALKPHOS 52 12/02/2022    AST 21 12/02/2022    ALT 24 12/02/2022     Lab Results   Component Value Date    WBC 5.3 12/02/2022    RBC 3.98 (L) 12/02/2022    HGB 12.7 (L) 12/02/2022    HCT 39.8 (L) 12/02/2022    .0 (H) 12/02/2022    RDW 11.9 12/02/2022     12/02/2022      Lab Results   Component Value Date    CHOL 218 (H) 12/02/2022    TRIG 173 (H) 12/02/2022    HDL 50 12/02/2022    .00 12/02/2022    TOTALCHOLEST 4 12/02/2022     Lab Results   Component Value Date    TSH 1.6572 12/02/2022     Lab Results   Component Value Date    HGBA1C 5.1 12/02/2022        Lab Results   Component Value Date    PSA 0.48 12/02/2022         Assessment:     Problem List Items Addressed This Visit          Cardiac/Vascular    Primary hypertension (Chronic)    Current Assessment & Plan     Blood pressures appear to be adequately controlled with current treatment plan, including prescription blood pressure medication. Continue medical management and continue home blood pressure checks.  Blood pressure should be checked as discussed during medical visits, and average blood pressure should be no greater than 130/80.             Renal/    Elevated serum creatinine (Chronic)    Current Assessment & Plan     Serum creatinine slightly elevated, normal GFR, I will repeat his creatinine with a CMP in three months.  Does not hydrate very much, I would suggest increase/adequate hydration.         Relevant Orders    Comprehensive Metabolic Panel       Oncology    Macrocytic anemia (Chronic)    Current Assessment & Plan     Patient does have macrocytic anemia, no previous CBC is, I will investigate this in three months at his next visit with anemia testing.         Relevant Orders    Iron and TIBC    Ferritin    Reticulocytes    Path Review, Peripheral Smear    Folate    Vitamin B12    Comprehensive Metabolic Panel    CBC Auto Differential       Endocrine    Obesity (Chronic)    Current Assessment & Plan     Weight loss, healthy dietary choices, increased activity/exercise are recommended.  To lose weight, it is generally recommended to restrict calories to approximately 1500 calories per day to lose 1 lb per week, and approximately 1200 calories per day to lose up to 2 lb per week.  Generally, this is a healthy amount of weight to lose, and an appropriate amount of time to lose this amount of weight.  Adding exercise will assist in losing weight, particularly aerobic exercise such as walking.  Keep track of BMI (body mass index), below 25 is considered normal, over 25 but below 30 is considered overweight, anything over 30 is considered moderately obese, over 35 severely obese, and over 40 is characterized as morbidly obese.            Orthopedic    Chronic pain of left knee (Chronic)    Current Assessment & Plan     Try Celebrex 200 mg twice a day as needed for knee pain.         Relevant Medications    celecoxib (CELEBREX) 200 MG capsule     Other Visit Diagnoses       Wellness examination    -  Primary    Overall health status was reviewed.  Good health habits reinforced.  Annual wellness exams recommended.    Screening for prostate  cancer        TSH/thyroid function appears to be normal, continue current dose of thyroid supplementation medication.               Medication List with Changes/Refills   New Medications    CELECOXIB (CELEBREX) 200 MG CAPSULE    Take 1 capsule (200 mg total) by mouth 2 (two) times daily.   Current Medications    LISINOPRIL-HYDROCHLOROTHIAZIDE (PRINZIDE,ZESTORETIC) 20-12.5 MG PER TABLET    Take 1 tablet by mouth once daily.    TRIAMCINOLONE ACETONIDE 0.025% (KENALOG) 0.025 % CREAM       Discontinued Medications    NIRMATRELVIR-RITONAVIR (PAXLOVID, EUA,) 300 MG (150 MG X 2)-100 MG COPACKAGED TABLETS (EUA)    Take 3 tablets by mouth 2 (two) times daily. Each dose contains 2 nirmatrelvir (pink tablets) and 1 ritonavir (white tablet). Take all 3 tablets together    VALACYCLOVIR (VALTREX) 1000 MG TABLET    Take 1 tablet (1,000 mg total) by mouth 2 (two) times daily. for 7 days     Plan:             Follow up in about 3 months (around 3/7/2023) for Chronic condition F/up, Anemia F/up.

## 2022-12-07 NOTE — ASSESSMENT & PLAN NOTE
Serum creatinine slightly elevated, normal GFR, I will repeat his creatinine with a CMP in three months.  Does not hydrate very much, I would suggest increase/adequate hydration.

## 2022-12-07 NOTE — TELEPHONE ENCOUNTER
----- Message from Jina Tamayo sent at 12/7/2022  3:33 PM CST -----  Regarding: advice  Pt just had a visit and pcp stated that he was going to prescribed meds for his knew and he was wondering if he forgot about  [phone]

## 2022-12-07 NOTE — ASSESSMENT & PLAN NOTE
Patient does have macrocytic anemia, no previous CBC is, I will investigate this in three months at his next visit with anemia testing.

## 2022-12-13 ENCOUNTER — PATIENT MESSAGE (OUTPATIENT)
Dept: RESEARCH | Facility: HOSPITAL | Age: 62
End: 2022-12-13
Payer: COMMERCIAL

## 2023-01-23 ENCOUNTER — PATIENT MESSAGE (OUTPATIENT)
Dept: PRIMARY CARE CLINIC | Facility: CLINIC | Age: 63
End: 2023-01-23
Payer: COMMERCIAL

## 2023-01-23 DIAGNOSIS — T75.3XXA SEA SICKNESS, INITIAL ENCOUNTER: Primary | ICD-10-CM

## 2023-01-23 DIAGNOSIS — R11.0 NAUSEA: ICD-10-CM

## 2023-01-23 RX ORDER — SCOLOPAMINE TRANSDERMAL SYSTEM 1 MG/1
1 PATCH, EXTENDED RELEASE TRANSDERMAL
Qty: 4 PATCH | Refills: 0 | Status: SHIPPED | OUTPATIENT
Start: 2023-01-23 | End: 2023-04-19

## 2023-01-23 RX ORDER — ONDANSETRON 8 MG/1
8 TABLET, ORALLY DISINTEGRATING ORAL EVERY 6 HOURS PRN
Qty: 12 TABLET | Refills: 3 | Status: SHIPPED | OUTPATIENT
Start: 2023-01-23 | End: 2023-01-26

## 2023-02-08 ENCOUNTER — PATIENT MESSAGE (OUTPATIENT)
Dept: RESEARCH | Facility: HOSPITAL | Age: 63
End: 2023-02-08
Payer: COMMERCIAL

## 2023-03-28 ENCOUNTER — PATIENT MESSAGE (OUTPATIENT)
Dept: RESEARCH | Facility: HOSPITAL | Age: 63
End: 2023-03-28
Payer: MEDICARE

## 2023-04-04 ENCOUNTER — PATIENT MESSAGE (OUTPATIENT)
Dept: RESEARCH | Facility: HOSPITAL | Age: 63
End: 2023-04-04
Payer: MEDICARE

## 2023-04-18 ENCOUNTER — LAB VISIT (OUTPATIENT)
Dept: LAB | Facility: HOSPITAL | Age: 63
End: 2023-04-18
Attending: GENERAL PRACTICE
Payer: MEDICARE

## 2023-04-18 DIAGNOSIS — R79.89 ELEVATED SERUM CREATININE: Chronic | ICD-10-CM

## 2023-04-18 DIAGNOSIS — D53.9 MACROCYTIC ANEMIA: Chronic | ICD-10-CM

## 2023-04-18 DIAGNOSIS — Z13.6 SCREENING FOR CARDIOVASCULAR CONDITION: ICD-10-CM

## 2023-04-18 DIAGNOSIS — Z11.59 NEED FOR HEPATITIS C SCREENING TEST: ICD-10-CM

## 2023-04-18 DIAGNOSIS — Z00.00 WELLNESS EXAMINATION: ICD-10-CM

## 2023-04-18 LAB
ALBUMIN SERPL-MCNC: 3.9 G/DL (ref 3.4–4.8)
ALBUMIN/GLOB SERPL: 1.1 RATIO (ref 1.1–2)
ALP SERPL-CCNC: 49 UNIT/L (ref 40–150)
ALT SERPL-CCNC: 23 UNIT/L (ref 0–55)
AST SERPL-CCNC: 22 UNIT/L (ref 5–34)
BILIRUBIN DIRECT+TOT PNL SERPL-MCNC: 0.9 MG/DL
BUN SERPL-MCNC: 15.4 MG/DL (ref 8.4–25.7)
CALCIUM SERPL-MCNC: 9.6 MG/DL (ref 8.8–10)
CHLORIDE SERPL-SCNC: 104 MMOL/L (ref 98–107)
CO2 SERPL-SCNC: 29 MMOL/L (ref 23–31)
CREAT SERPL-MCNC: 1.23 MG/DL (ref 0.73–1.18)
FERRITIN SERPL-MCNC: 357.98 NG/ML (ref 21.81–274.66)
FOLATE SERPL-MCNC: 16.2 NG/ML (ref 7–31.4)
GFR SERPLBLD CREATININE-BSD FMLA CKD-EPI: >60 MLS/MIN/1.73/M2
GLOBULIN SER-MCNC: 3.4 GM/DL (ref 2.4–3.5)
GLUCOSE SERPL-MCNC: 109 MG/DL (ref 82–115)
HCV AB SERPL QL IA: NONREACTIVE
HEMATOLOGIST REVIEW: NORMAL
IRON SATN MFR SERPL: 38 % (ref 20–50)
IRON SERPL-MCNC: 93 UG/DL (ref 65–175)
POTASSIUM SERPL-SCNC: 4.4 MMOL/L (ref 3.5–5.1)
PROT SERPL-MCNC: 7.3 GM/DL (ref 5.8–7.6)
RET# (OHS): 0.06 (ref 0.03–0.1)
RETICULOCYTE COUNT AUTOMATED (OLG): 1.53 % (ref 1.1–2.1)
SODIUM SERPL-SCNC: 139 MMOL/L (ref 136–145)
TIBC SERPL-MCNC: 155 UG/DL (ref 69–240)
TIBC SERPL-MCNC: 248 UG/DL (ref 250–450)
TRANSFERRIN SERPL-MCNC: 216 MG/DL (ref 163–344)
VIT B12 SERPL-MCNC: 643 PG/ML (ref 213–816)

## 2023-04-18 PROCEDURE — 80053 COMPREHEN METABOLIC PANEL: CPT

## 2023-04-18 PROCEDURE — 83550 IRON BINDING TEST: CPT

## 2023-04-18 PROCEDURE — 86803 HEPATITIS C AB TEST: CPT

## 2023-04-18 PROCEDURE — 82607 VITAMIN B-12: CPT

## 2023-04-18 PROCEDURE — 36415 COLL VENOUS BLD VENIPUNCTURE: CPT

## 2023-04-18 PROCEDURE — 82746 ASSAY OF FOLIC ACID SERUM: CPT

## 2023-04-18 PROCEDURE — 82728 ASSAY OF FERRITIN: CPT

## 2023-04-18 PROCEDURE — 85045 AUTOMATED RETICULOCYTE COUNT: CPT

## 2023-04-19 ENCOUNTER — OFFICE VISIT (OUTPATIENT)
Dept: PRIMARY CARE CLINIC | Facility: CLINIC | Age: 63
End: 2023-04-19
Payer: MEDICARE

## 2023-04-19 ENCOUNTER — PATIENT MESSAGE (OUTPATIENT)
Dept: PRIMARY CARE CLINIC | Facility: CLINIC | Age: 63
End: 2023-04-19

## 2023-04-19 VITALS
RESPIRATION RATE: 18 BRPM | BODY MASS INDEX: 32.5 KG/M2 | HEART RATE: 69 BPM | WEIGHT: 227 LBS | OXYGEN SATURATION: 99 % | HEIGHT: 70 IN | DIASTOLIC BLOOD PRESSURE: 82 MMHG | SYSTOLIC BLOOD PRESSURE: 142 MMHG

## 2023-04-19 DIAGNOSIS — Z00.00 WELLNESS EXAMINATION: ICD-10-CM

## 2023-04-19 DIAGNOSIS — Z12.5 SCREENING FOR PROSTATE CANCER: ICD-10-CM

## 2023-04-19 DIAGNOSIS — D53.9 MACROCYTIC ANEMIA: ICD-10-CM

## 2023-04-19 DIAGNOSIS — E66.09 CLASS 1 OBESITY DUE TO EXCESS CALORIES WITH SERIOUS COMORBIDITY AND BODY MASS INDEX (BMI) OF 32.0 TO 32.9 IN ADULT: Chronic | ICD-10-CM

## 2023-04-19 DIAGNOSIS — I10 PRIMARY HYPERTENSION: Primary | Chronic | ICD-10-CM

## 2023-04-19 DIAGNOSIS — R79.89 ELEVATED SERUM CREATININE: Chronic | ICD-10-CM

## 2023-04-19 PROBLEM — E66.811 CLASS 1 OBESITY DUE TO EXCESS CALORIES WITH SERIOUS COMORBIDITY AND BODY MASS INDEX (BMI) OF 32.0 TO 32.9 IN ADULT: Chronic | Status: ACTIVE | Noted: 2022-09-20

## 2023-04-19 PROCEDURE — 99213 PR OFFICE/OUTPT VISIT, EST, LEVL III, 20-29 MIN: ICD-10-PCS | Mod: ,,, | Performed by: GENERAL PRACTICE

## 2023-04-19 PROCEDURE — 99213 OFFICE O/P EST LOW 20 MIN: CPT | Mod: ,,, | Performed by: GENERAL PRACTICE

## 2023-04-19 NOTE — ASSESSMENT & PLAN NOTE
Prescribed lisinopril hydrochlorothiazide 20-12.5 mg daily.  Blood pressures appear to be adequately controlled with current treatment plan, including prescription blood pressure medication. Continue medical management and continue home blood pressure checks.  Blood pressure should be checked as discussed during medical visits, and average blood pressure should be no greater than 130/80.

## 2023-04-19 NOTE — ASSESSMENT & PLAN NOTE
Component Ref Range & Units 09:00 4 mo ago   Sodium Level 136 - 145 mmol/L 139  140    Potassium Level 3.5 - 5.1 mmol/L 4.4  4.7    Chloride 98 - 107 mmol/L 104  106    Carbon Dioxide 23 - 31 mmol/L 29  27    Glucose Level 82 - 115 mg/dL 109  112    Blood Urea Nitrogen 8.4 - 25.7 mg/dL 15.4  22.8    Creatinine 0.73 - 1.18 mg/dL 1.23 High   1.29 High         Serum creatinine slightly improved, GFR normal, no significant concern for CKD at this time, we will continue to monitor annually.

## 2023-04-19 NOTE — ASSESSMENT & PLAN NOTE
Anemia studies do not show any significant abnormalities, we will continue to follow blood counts over time.  A CBC will be repeated today, those results will be monitored and reported, and we will check his blood counts annually.

## 2023-04-19 NOTE — PROGRESS NOTES
"Subjective:       Patient ID: Hernando Will is a 63 y.o. male.    Chief Complaint: No chief complaint on file.    Patient presents to the clinic today for chronic condition follow-up.  His wellness exam in December he was discovered to have macrocytic anemia.  He returns today for recheck, had some anemia testing, also with a slightly elevated creatinine with a repeat CMP.  However, a CBC was not ordered or done.  Anemia studies look good, ferritin level moderately elevated, the rest of the studies appear to be normal including B12 and folic acid levels.    Last wellness exam was 12/07/2022.      Review of Systems   Constitutional: Negative.  Negative for fatigue and fever.   HENT: Negative.  Negative for sore throat and trouble swallowing.    Eyes: Negative.  Negative for redness and visual disturbance.   Respiratory: Negative.  Negative for chest tightness and shortness of breath.    Cardiovascular: Negative.  Negative for chest pain.   Gastrointestinal: Negative.  Negative for abdominal pain, blood in stool and nausea.   Endocrine: Negative.  Negative for cold intolerance, heat intolerance and polyuria.   Genitourinary: Negative.    Musculoskeletal: Negative.  Negative for arthralgias, gait problem and joint swelling.   Integumentary:  Negative for rash. Negative.   Neurological: Negative.  Negative for dizziness, weakness and headaches.   Psychiatric/Behavioral: Negative.  Negative for agitation and dysphoric mood.        Objective:     Vitals:    04/19/23 0947   BP: (!) 142/82   Pulse: 69   Resp: 18   SpO2: 99%   Weight: 103 kg (227 lb)   Height: 5' 10" (1.778 m)   Body mass index is 32.57 kg/m².     Physical Exam  Constitutional:       Appearance: Normal appearance.   Eyes:      Conjunctiva/sclera: Conjunctivae normal.   Cardiovascular:      Rate and Rhythm: Normal rate and regular rhythm.   Pulmonary:      Effort: Pulmonary effort is normal.      Breath sounds: Normal breath sounds.   Skin:     General: " Skin is warm and dry.   Neurological:      Mental Status: He is alert.   Psychiatric:         Mood and Affect: Mood normal.         Behavior: Behavior normal.         Lab Results   Component Value Date     04/18/2023    K 4.4 04/18/2023    CO2 29 04/18/2023    BUN 15.4 04/18/2023    CREATININE 1.23 (H) 04/18/2023    CALCIUM 9.6 04/18/2023    ALBUMIN 3.9 04/18/2023    BILITOT 0.9 04/18/2023    ALKPHOS 49 04/18/2023    AST 22 04/18/2023    ALT 23 04/18/2023    EGFRNORACEVR >60 04/18/2023     Lab Results   Component Value Date    WBC 5.3 12/02/2022    RBC 3.98 (L) 12/02/2022    HGB 12.7 (L) 12/02/2022    HCT 39.8 (L) 12/02/2022    .0 (H) 12/02/2022    RDW 11.9 12/02/2022     12/02/2022      Lab Results   Component Value Date    CHOL 218 (H) 12/02/2022    TRIG 173 (H) 12/02/2022    HDL 50 12/02/2022    .00 12/02/2022    TOTALCHOLEST 4 12/02/2022     Lab Results   Component Value Date    TSH 1.6572 12/02/2022     Lab Results   Component Value Date    HGBA1C 5.1 12/02/2022        Lab Results   Component Value Date    PSA 0.48 12/02/2022         Assessment:     Problem List Items Addressed This Visit          Cardiac/Vascular    Primary hypertension - Primary (Chronic)    Current Assessment & Plan     Prescribed lisinopril hydrochlorothiazide 20-12.5 mg daily.  Blood pressures appear to be adequately controlled with current treatment plan, including prescription blood pressure medication. Continue medical management and continue home blood pressure checks.  Blood pressure should be checked as discussed during medical visits, and average blood pressure should be no greater than 130/80.           Relevant Orders    Lipid Panel    Comprehensive Metabolic Panel       Renal/    Elevated serum creatinine (Chronic)    Current Assessment & Plan     Component Ref Range & Units 09:00 4 mo ago   Sodium Level 136 - 145 mmol/L 139  140    Potassium Level 3.5 - 5.1 mmol/L 4.4  4.7    Chloride 98 - 107 mmol/L  "104  106    Carbon Dioxide 23 - 31 mmol/L 29  27    Glucose Level 82 - 115 mg/dL 109  112    Blood Urea Nitrogen 8.4 - 25.7 mg/dL 15.4  22.8    Creatinine 0.73 - 1.18 mg/dL 1.23 High   1.29 High         Serum creatinine slightly improved, GFR normal, no significant concern for CKD at this time, we will continue to monitor annually.         Relevant Orders    Comprehensive Metabolic Panel       Oncology    Macrocytic anemia (Chronic)    Current Assessment & Plan     Anemia studies do not show any significant abnormalities, we will continue to follow blood counts over time.  A CBC will be repeated today, those results will be monitored and reported, and we will check his blood counts annually.           Relevant Orders    CBC Auto Differential    CBC Auto Differential       Endocrine    Class 1 obesity due to excess calories with serious comorbidity and body mass index (BMI) of 32.0 to 32.9 in adult (Chronic)    Current Assessment & Plan     Weight loss, healthy dietary choices, increased activity/exercise are recommended.  To lose weight, it is generally recommended to restrict calories to approximately 1500 calories per day to lose 1 lb per week, and approximately 1200 calories per day to lose up to 2 lb per week.  Generally, this is a healthy amount of weight to lose, and an appropriate amount of time to lose this amount of weight.  Adding exercise will assist in losing weight, particularly aerobic exercise such as walking.  However, resistance training, or lifting weights" over time may assistant weight loss by increasing basal metabolic rate, or the rate at which your body burns calories during periods of inactivity.    Keep track of BMI (body mass index), below 25 is considered normal, over 25 but below 30 is considered overweight, anything over 30 is considered moderately obese, over 35 severely obese, and over 40 is characterized as morbidly obese.            Other Visit Diagnoses       Wellness examination   "      This diagnosis does not apply to this visit, wellness exam with pre visit labs will be scheduled/ordered for future visit.    Relevant Orders    TSH    Lipid Panel    Comprehensive Metabolic Panel    CBC Auto Differential    PSA, Screening    Screening for prostate cancer        This diagnosis does not apply to this visit, appropriate prostate cancer screening will be ordered for next visit/wellness exam.    Relevant Orders    PSA, Screening               Medication List with Changes/Refills   Current Medications    CELECOXIB (CELEBREX) 200 MG CAPSULE    Take 1 capsule (200 mg total) by mouth 2 (two) times daily.    LISINOPRIL-HYDROCHLOROTHIAZIDE (PRINZIDE,ZESTORETIC) 20-12.5 MG PER TABLET    Take 1 tablet by mouth once daily.   Discontinued Medications    SCOPOLAMINE (TRANSDERM-SCOP) 1.3-1.5 MG (1 MG OVER 3 DAYS)    Place 1 patch onto the skin every 72 hours.    TRIAMCINOLONE ACETONIDE 0.025% (KENALOG) 0.025 % CREAM         Plan:             Follow up in about 34 weeks (around 12/13/2023) for Medicare Wellness.

## 2023-06-07 ENCOUNTER — IMMUNIZATION (OUTPATIENT)
Dept: FAMILY MEDICINE | Facility: CLINIC | Age: 63
End: 2023-06-07
Payer: MEDICARE

## 2023-06-07 DIAGNOSIS — Z23 NEED FOR VACCINATION: Primary | ICD-10-CM

## 2023-06-07 PROCEDURE — 91312 COVID-19, MRNA, LNP-S, BIVALENT BOOSTER, PF, 30 MCG/0.3 ML DOSE: CPT | Mod: S$GLB,,, | Performed by: INTERNAL MEDICINE

## 2023-06-07 PROCEDURE — 91312 COVID-19, MRNA, LNP-S, BIVALENT BOOSTER, PF, 30 MCG/0.3 ML DOSE: ICD-10-PCS | Mod: S$GLB,,, | Performed by: INTERNAL MEDICINE

## 2023-06-07 PROCEDURE — 0124A COVID-19, MRNA, LNP-S, BIVALENT BOOSTER, PF, 30 MCG/0.3 ML DOSE: CPT | Mod: S$GLB,,, | Performed by: INTERNAL MEDICINE

## 2023-06-07 PROCEDURE — 0124A COVID-19, MRNA, LNP-S, BIVALENT BOOSTER, PF, 30 MCG/0.3 ML DOSE: ICD-10-PCS | Mod: S$GLB,,, | Performed by: INTERNAL MEDICINE

## 2023-09-07 ENCOUNTER — PATIENT MESSAGE (OUTPATIENT)
Dept: RESEARCH | Facility: HOSPITAL | Age: 63
End: 2023-09-07
Payer: MEDICARE

## 2023-11-30 ENCOUNTER — PATIENT MESSAGE (OUTPATIENT)
Dept: PRIMARY CARE CLINIC | Facility: CLINIC | Age: 63
End: 2023-11-30
Payer: MEDICARE

## 2023-12-01 ENCOUNTER — LAB VISIT (OUTPATIENT)
Dept: LAB | Facility: HOSPITAL | Age: 63
End: 2023-12-01
Attending: GENERAL PRACTICE
Payer: MEDICARE

## 2023-12-01 DIAGNOSIS — D53.9 MACROCYTIC ANEMIA: ICD-10-CM

## 2023-12-01 DIAGNOSIS — Z12.5 SCREENING FOR PROSTATE CANCER: ICD-10-CM

## 2023-12-01 DIAGNOSIS — I10 PRIMARY HYPERTENSION: Chronic | ICD-10-CM

## 2023-12-01 DIAGNOSIS — R79.89 ELEVATED SERUM CREATININE: Chronic | ICD-10-CM

## 2023-12-01 DIAGNOSIS — Z00.00 WELLNESS EXAMINATION: ICD-10-CM

## 2023-12-01 LAB
ALBUMIN SERPL-MCNC: 4 G/DL (ref 3.4–4.8)
ALBUMIN/GLOB SERPL: 1.2 RATIO (ref 1.1–2)
ALP SERPL-CCNC: 56 UNIT/L (ref 40–150)
ALT SERPL-CCNC: 22 UNIT/L (ref 0–55)
AST SERPL-CCNC: 20 UNIT/L (ref 5–34)
BASOPHILS # BLD AUTO: 0.04 X10(3)/MCL
BASOPHILS NFR BLD AUTO: 0.9 %
BILIRUB SERPL-MCNC: 0.8 MG/DL
BUN SERPL-MCNC: 16.7 MG/DL (ref 8.4–25.7)
CALCIUM SERPL-MCNC: 9.3 MG/DL (ref 8.8–10)
CHLORIDE SERPL-SCNC: 108 MMOL/L (ref 98–107)
CHOLEST SERPL-MCNC: 225 MG/DL
CHOLEST/HDLC SERPL: 5 {RATIO} (ref 0–5)
CO2 SERPL-SCNC: 28 MMOL/L (ref 23–31)
CREAT SERPL-MCNC: 1.37 MG/DL (ref 0.73–1.18)
EOSINOPHIL # BLD AUTO: 0.55 X10(3)/MCL (ref 0–0.9)
EOSINOPHIL NFR BLD AUTO: 12 %
ERYTHROCYTE [DISTWIDTH] IN BLOOD BY AUTOMATED COUNT: 12 % (ref 11.5–17)
GFR SERPLBLD CREATININE-BSD FMLA CKD-EPI: 57 MLS/MIN/1.73/M2
GLOBULIN SER-MCNC: 3.4 GM/DL (ref 2.4–3.5)
GLUCOSE SERPL-MCNC: 104 MG/DL (ref 82–115)
HCT VFR BLD AUTO: 37.3 % (ref 42–52)
HDLC SERPL-MCNC: 41 MG/DL (ref 35–60)
HGB BLD-MCNC: 12.3 G/DL (ref 14–18)
IMM GRANULOCYTES # BLD AUTO: 0.01 X10(3)/MCL (ref 0–0.04)
IMM GRANULOCYTES NFR BLD AUTO: 0.2 %
LDLC SERPL CALC-MCNC: 155 MG/DL (ref 50–140)
LYMPHOCYTES # BLD AUTO: 1.99 X10(3)/MCL (ref 0.6–4.6)
LYMPHOCYTES NFR BLD AUTO: 43.3 %
MCH RBC QN AUTO: 32 PG (ref 27–31)
MCHC RBC AUTO-ENTMCNC: 33 G/DL (ref 33–36)
MCV RBC AUTO: 97.1 FL (ref 80–94)
MONOCYTES # BLD AUTO: 0.47 X10(3)/MCL (ref 0.1–1.3)
MONOCYTES NFR BLD AUTO: 10.2 %
NEUTROPHILS # BLD AUTO: 1.54 X10(3)/MCL (ref 2.1–9.2)
NEUTROPHILS NFR BLD AUTO: 33.4 %
NRBC BLD AUTO-RTO: 0 %
PLATELET # BLD AUTO: 206 X10(3)/MCL (ref 130–400)
PMV BLD AUTO: 11.2 FL (ref 7.4–10.4)
POTASSIUM SERPL-SCNC: 4.3 MMOL/L (ref 3.5–5.1)
PROT SERPL-MCNC: 7.4 GM/DL (ref 5.8–7.6)
PSA SERPL-MCNC: 0.38 NG/ML
RBC # BLD AUTO: 3.84 X10(6)/MCL (ref 4.7–6.1)
SODIUM SERPL-SCNC: 141 MMOL/L (ref 136–145)
TRIGL SERPL-MCNC: 144 MG/DL (ref 34–140)
TSH SERPL-ACNC: 2 UIU/ML (ref 0.35–4.94)
VLDLC SERPL CALC-MCNC: 29 MG/DL
WBC # SPEC AUTO: 4.6 X10(3)/MCL (ref 4.5–11.5)

## 2023-12-01 PROCEDURE — 36415 COLL VENOUS BLD VENIPUNCTURE: CPT

## 2023-12-01 PROCEDURE — 80053 COMPREHEN METABOLIC PANEL: CPT

## 2023-12-01 PROCEDURE — 84153 ASSAY OF PSA TOTAL: CPT

## 2023-12-01 PROCEDURE — 80061 LIPID PANEL: CPT

## 2023-12-01 PROCEDURE — 85025 COMPLETE CBC W/AUTO DIFF WBC: CPT

## 2023-12-01 PROCEDURE — 84443 ASSAY THYROID STIM HORMONE: CPT

## 2023-12-07 PROBLEM — R94.4 DECREASED GFR: Status: ACTIVE | Noted: 2023-12-07

## 2023-12-07 NOTE — ASSESSMENT & PLAN NOTE
Component Ref Range & Units 6 d ago 7 mo ago 1 yr ago   Sodium Level 136 - 145 mmol/L 141 139 140   Potassium Level 3.5 - 5.1 mmol/L 4.3 4.4 4.7   Chloride 98 - 107 mmol/L 108 High  104 106   Carbon Dioxide 23 - 31 mmol/L 28 29 27   Glucose Level 82 - 115 mg/dL 104 109 112   Blood Urea Nitrogen 8.4 - 25.7 mg/dL 16.7 15.4 22.8   Creatinine 0.73 - 1.18 mg/dL 1.37 High  1.23 High  1.29 High         Creatinine continues to be slightly elevated, slightly more so than at last check.

## 2023-12-07 NOTE — ASSESSMENT & PLAN NOTE
Component Ref Range & Units 6 d ago 1 yr ago   WBC 4.50 - 11.50 x10(3)/mcL 4.60 5.3 R   RBC 4.70 - 6.10 x10(6)/mcL 3.84 Low  3.98 Low    Hgb 14.0 - 18.0 g/dL 12.3 Low  12.7 Low  R   Hct 42.0 - 52.0 % 37.3 Low  39.8 Low    MCV 80.0 - 94.0 fL 97.1 High  100.0 High    MCH 27.0 - 31.0 pg 32.0 High  31.9 High    MCHC 33.0 - 36.0 g/dL 33.0 31.9 Low  R   RDW 11.5 - 17.0 % 12.0 11.9   Platelet 130 - 400 x10(3)/mcL 206 222        Anemia mild and stable, we will continue to monitor annually.

## 2023-12-07 NOTE — ASSESSMENT & PLAN NOTE
Patient given the following recommendations:  -Follow low sodium diet (2 grams a day)   -Control high blood pressure ( goal BP < 130/80, please record BP at home every day and bring log to next office visit to assure that home cuff is calibrated at minimum every 12 months)   -Exercise at least 30 minutes a day, 5 days a week.   -Maintain healthy weight.   -Stay well hydrated   -Receive Pneumovax, Flu, and HBV vaccines if indicated.   -Do not take NSAIDs (Ibuprofen, Naproxen, Aleve, Advil, Toradol, Mobic), may take only Tylenol as needed for pain/headaches.   -Take cholesterol-lowering medications as prescribed (LDL goal <100)

## 2023-12-07 NOTE — PROGRESS NOTES
Patient ID: [id number]     Chief Complaint: Annual Exam      HPI:     Hernando Will is a 63 y.o. male here today for his Welcome to Medicare Wellness. No other complaints today.       ----------------------------  Hypertension  Personal history of colonic polyps      Comment:  COLONOSCOPY     Past Surgical History:   Procedure Laterality Date    COLONOSCOPY  05/11/2022    Sabino Kelly MD       Review of patient's allergies indicates:  No Known Allergies    Outpatient Medications Marked as Taking for the 12/8/23 encounter (Office Visit) with Abraham Flowers MD   Medication Sig Dispense Refill    [DISCONTINUED] lisinopriL-hydrochlorothiazide (PRINZIDE,ZESTORETIC) 20-12.5 mg per tablet Take 1 tablet by mouth once daily.         Social History     Socioeconomic History    Marital status:    Tobacco Use    Smoking status: Never    Smokeless tobacco: Never   Substance and Sexual Activity    Alcohol use: Yes     Alcohol/week: 1.0 standard drink of alcohol     Types: 1 Cans of beer per week    Drug use: Never    Sexual activity: Yes     Partners: Female        Family History   Problem Relation Age of Onset    Emphysema Mother     Diabetes Mother     COPD Mother     Throat cancer Father     Cancer Father         Throat cancer        Patient Care Team:  Abraham Flowers MD as PCP - General (Family Medicine)  Sabino Kelly MD as Consulting Physician (Gastroenterology)  Naresh Aldridge MD as Consulting Physician (Orthopedic Surgery)     Opioid Screening: Patient medication list reviewed, patient is not taking prescription opioids. Patient is not using additional opioids than prescribed. Patient is at low risk of substance abuse based on this opioid use history.       Subjective:     Review of Systems   Constitutional: Negative.  Negative for malaise/fatigue and weight loss.   HENT: Negative.     Eyes: Negative.    Respiratory: Negative.  Negative for shortness of breath.    Cardiovascular: Negative.  Negative for  chest pain.   Gastrointestinal: Negative.    Genitourinary: Negative.    Musculoskeletal: Negative.    Skin: Negative.    Neurological: Negative.  Negative for focal weakness, weakness and headaches.   Endo/Heme/Allergies: Negative.    Psychiatric/Behavioral: Negative.  Negative for depression and memory loss. The patient is not nervous/anxious.          Patient Reported Health Risk Assessment  What [age] 65-69  Are you male or female?: Male  During the past four weeks, how much have you been bothered by emotional problems such as feeling anxious, depressed, irritable, sad, or downhearted and blue?: Not at all  During the past five weeks, has your physical and/or emotional health limited your social activities with family, friends, neighbors, or groups?: Not at all  During the past four weeks, how much bodily pain have you generally had?: No pain  During the past four weeks, was someone available to help if you needed and wanted help?: Yes, as much as I wanted  During the past four weeks, what was the hardest physical activity you could do for at least two minutes?: Light  Can you get to places out of walking distance without help?  (For example, can you travel alone on buses or taxis, or drive your own car?): Yes  Can you go shopping for groceries or clothes without someone's help?: Yes  Can you prepare your own meals?: Yes  Can you do your own housework without help?: Yes  Because of any health problems, do you need the help of another person with your personal care needs such as eating, bathing, dressing, or getting around the house?: No  Can you handle your own money without help?: Yes  During the past four weeks, how would you rate your health in general?: Good  How have things been going for you during the past four weeks?: Pretty well  Are you having difficulties driving your car?: No  Do you always fasten your seat belt when you are in a car?: Yes, usually  How often in the past four weeks have you  "been bothered by falling or dizzy when standing up?: Never  How often in the past four weeks have you been bothered by sexual problems?: Never  How often in the past four weeks have you been bothered by trouble eating well?: Never  How often in the past four weeks have you been bothered by teeth or denture problems?: Never  How often in the past four weeks have you been bothered with problems using the telephone?: Never  How often in the past four weeks have you been bothered by tiredness or fatigue?: Never  Have you fallen two or more times in the past year?: No  Are you afraid of falling?: No  Are you a smoker?: No  During the past four weeks, how many drinks of wine, beer, or other alcoholic beverages did you have?: One drink or less per week  Do you exercise for about 20 minutes three or more days a week?: No, I usually do not exercise this much  Have you been given any information to help you with hazards in your house that might hurt you?: No  Have you been given any information to help you with keeping track of your medications?: No  How often do you have trouble taking medicines the way you've been told to take them?: I always take them as prescribed  How confident are you that you can control and manage most of your health problems?: Very confident  What is your race? (Check all that apply.):     Objective:     /83   Pulse 60   Resp 18   Ht 5' 10" (1.778 m)   Wt 103.9 kg (229 lb)   SpO2 100%   BMI 32.86 kg/m²     Physical Exam  Constitutional:       Appearance: He is obese.   HENT:      Head: Normocephalic.      Mouth/Throat:      Mouth: Mucous membranes are moist.      Pharynx: Oropharynx is clear.   Eyes:      Pupils: Pupils are equal, round, and reactive to light.   Cardiovascular:      Rate and Rhythm: Normal rate and regular rhythm.   Pulmonary:      Effort: Pulmonary effort is normal.      Breath sounds: Normal breath sounds.   Abdominal:      Palpations: Abdomen is soft. "   Musculoskeletal:         General: Normal range of motion.   Skin:     General: Skin is warm and dry.   Neurological:      General: No focal deficit present.      Mental Status: He is alert.   Psychiatric:         Mood and Affect: Mood normal.         Behavior: Behavior normal.         Thought Content: Thought content normal.         Judgment: Judgment normal.         Lab Results   Component Value Date     12/01/2023    K 4.3 12/01/2023    CO2 28 12/01/2023    BUN 16.7 12/01/2023    CREATININE 1.37 (H) 12/01/2023    CALCIUM 9.3 12/01/2023    ALBUMIN 4.0 12/01/2023    BILITOT 0.8 12/01/2023    ALKPHOS 56 12/01/2023    AST 20 12/01/2023    ALT 22 12/01/2023    EGFRNORACEVR 57 12/01/2023     Lab Results   Component Value Date    WBC 4.60 12/01/2023    RBC 3.84 (L) 12/01/2023    HGB 12.3 (L) 12/01/2023    HCT 37.3 (L) 12/01/2023    MCV 97.1 (H) 12/01/2023    RDW 12.0 12/01/2023     12/01/2023      Lab Results   Component Value Date    CHOL 225 (H) 12/01/2023    TRIG 144 (H) 12/01/2023    HDL 41 12/01/2023    .00 (H) 12/01/2023    TOTALCHOLEST 5 12/01/2023     Lab Results   Component Value Date    TSH 1.997 12/01/2023     Lab Results   Component Value Date    HGBA1C 5.1 12/02/2022        Lab Results   Component Value Date    PSA 0.38 12/01/2023              No data to display                  12/8/2023     9:00 AM 6/1/2022     8:30 AM   Fall Risk Assessment - Outpatient   Mobility Status Ambulatory Ambulatory   Number of falls 0 0   Identified as fall risk False False           Depression Screening  Over the past two weeks, has the patient felt down, depressed, or hopeless?: No  Over the past two weeks, has the patient felt little interest or pleasure in doing things?: No  Functional Ability/Safety Screening  Was the patient's timed Up & Go test unsteady or longer than 30 seconds?: No  Does the patient need help with phone, transportation, shopping, preparing meals, housework, laundry, meds, or  managing money?: No  Does the patient's home have rugs in the hallway, lack grab bars in the bathroom, lack handrails on the stairs or have poor lighting?: No  Have you noticed any hearing difficulties?: No  Cognitive Function (Assessed through direct observation with due consideration of information obtained by way of patient reports and/or concerns raised by family, friends, caretakers, or others)    Does the patient repeat questions/statements in the same day?: No  Does the patient have trouble remembering the date, year, and time?: No  Does the patient have difficulty managing finances?: No  Does the patient have a decreased sense of direction?: No  Assessment:       ICD-10-CM ICD-9-CM   1. Welcome to Medicare preventive visit  Z00.00 V70.0   2. Screening for prostate cancer  Z12.5 V76.44   3. Primary hypertension  I10 401.9   4. Macrocytic anemia  D53.9 281.9   5. Elevated serum creatinine  R79.89 790.99   6. Decreased GFR  R94.4 794.4   7. Class 1 obesity due to excess calories with serious comorbidity and body mass index (BMI) of 32.0 to 32.9 in adult  E66.09 278.00    Z68.32 V85.32   8. Chronic pain of left knee  M25.562 719.46    G89.29 338.29   9. Dyslipidemia  E78.5 272.4        Plan:     Medicare Annual Wellness and Personalized Prevention Plan:   Fall Risk + Home Safety + Hearing Impairment + Depression Screen + Cognitive Impairment Screen + Health Risk Assessment all reviewed.       Health Maintenance Topics with due status: Not Due       Topic Last Completion Date    Colorectal Cancer Screening 05/11/2022    Hemoglobin A1c (Diabetic Prevention Screening) 12/02/2022    Lipid Panel 12/01/2023      The patient's Health Maintenance was reviewed and the following appears to be due at this time:   There are no preventive care reminders to display for this patient.    Health risk assessment:  After review of the patient's medical record as it relates to health risk assessment over the next 5-10 years per  recommendations of the [place], it was determined that all pertinent recommendations have been appropriately addressed. The patient does not desire any further preventative care measures or screening tests at this time.  We will continue to reassess at each annual visit.    1. Welcome to Medicare preventive visit  Comments:  Overall health status was reviewed.  Good health habits reinforced.  Annual wellness exams recommended.    2. Screening for prostate cancer  Comments:  Prostate specific antigen blood test obtained was essentially normal, suggesting that there is no current concern for prostate cancer.  Digital exam deferred.    3. Primary hypertension  Overview:  Prescribed lisinopril hydrochlorothiazide 20-12.5 mg daily.    Assessment & Plan:  Blood pressures appear to be adequately controlled with current treatment plan, including prescription blood pressure medication. Continue medical management and continue home blood pressure checks.  Blood pressure should be checked as discussed during medical visits, and average blood pressure should be no greater than 130/80.    Orders:  -     lisinopriL-hydrochlorothiazide (PRINZIDE,ZESTORETIC) 20-12.5 mg per tablet; Take 1 tablet by mouth once daily.  Dispense: 90 tablet; Refill: 3    4. Macrocytic anemia  Overview:  Stable macrocytic anemia, following annually.  Uncertain etiology.    Assessment & Plan:        Component Ref Range & Units 6 d ago 1 yr ago   WBC 4.50 - 11.50 x10(3)/mcL 4.60 5.3 R   RBC 4.70 - 6.10 x10(6)/mcL 3.84 Low  3.98 Low    Hgb 14.0 - 18.0 g/dL 12.3 Low  12.7 Low  R   Hct 42.0 - 52.0 % 37.3 Low  39.8 Low    MCV 80.0 - 94.0 fL 97.1 High  100.0 High    MCH 27.0 - 31.0 pg 32.0 High  31.9 High    MCHC 33.0 - 36.0 g/dL 33.0 31.9 Low  R   RDW 11.5 - 17.0 % 12.0 11.9   Platelet 130 - 400 x10(3)/mcL 206 222        Anemia mild and stable, we will continue to monitor annually.      Orders:  -     CBC Auto Differential; Future; Expected date:  06/08/2024    5. Elevated serum creatinine  Overview:  Slight elevation in creatinine,    Assessment & Plan:         Component Ref Range & Units 6 d ago 7 mo ago 1 yr ago   Sodium Level 136 - 145 mmol/L 141 139 140   Potassium Level 3.5 - 5.1 mmol/L 4.3 4.4 4.7   Chloride 98 - 107 mmol/L 108 High  104 106   Carbon Dioxide 23 - 31 mmol/L 28 29 27   Glucose Level 82 - 115 mg/dL 104 109 112   Blood Urea Nitrogen 8.4 - 25.7 mg/dL 16.7 15.4 22.8   Creatinine 0.73 - 1.18 mg/dL 1.37 High  1.23 High  1.29 High         Creatinine continues to be slightly elevated, slightly more so than at last check.      6. Decreased GFR  Overview:  GFR slightly decreased, will recheck in six months at next visit.    Assessment & Plan:  Patient given the following recommendations:  -Follow low sodium diet (2 grams a day)   -Control high blood pressure ( goal BP < 130/80, please record BP at home every day and bring log to next office visit to assure that home cuff is calibrated at minimum every 12 months)   -Exercise at least 30 minutes a day, 5 days a week.   -Maintain healthy weight.   -Stay well hydrated   -Receive Pneumovax, Flu, and HBV vaccines if indicated.   -Do not take NSAIDs (Ibuprofen, Naproxen, Aleve, Advil, Toradol, Mobic), may take only Tylenol as needed for pain/headaches.   -Take cholesterol-lowering medications as prescribed (LDL goal <100)    Orders:  -     Comprehensive Metabolic Panel; Future; Expected date: 06/08/2024    7. Class 1 obesity due to excess calories with serious comorbidity and body mass index (BMI) of 32.0 to 32.9 in adult  Overview:  Weight loss, healthy dietary choices, increased activity/exercise are recommended.  To lose weight, it is generally recommended to restrict calories to approximately 1500 calories per day to lose 1 lb per week, and approximately 1200 calories per day to lose up to 2 lb per week.  Generally, this is a healthy amount of weight to lose, and an appropriate amount of time to lose  "this amount of weight.  Adding exercise will assist in losing weight, particularly aerobic exercise such as walking.  However, resistance training, or lifting weights" over time may assistant weight loss by increasing basal metabolic rate, or the rate at which your body burns calories during periods of inactivity.    Keep track of BMI (body mass index), below 25 is considered normal, over 25 but below 30 is considered overweight, anything over 30 is considered moderately obese, over 35 severely obese, and over 40 is characterized as morbidly obese.      8. Chronic pain of left knee  -     celecoxib (CELEBREX) 200 MG capsule; Take 1 capsule (200 mg total) by mouth 2 (two) times daily.  Dispense: 180 capsule; Refill: 3    9. Dyslipidemia  Overview:  Not prescribed any lipid-lowering medication.    Assessment & Plan:  Component Ref Range & Units 7 d ago 1 yr ago   Cholesterol Total <=200 mg/dL 225 High  218 High    HDL Cholesterol 35 - 60 mg/dL 41 50   Triglyceride 34 - 140 mg/dL 144 High  173 High    Cholesterol/HDL Ratio 0 - 5 5 4   Very Low Density Lipoprotein  29 35   LDL Cholesterol 50.00 - 140.00 mg/dL 155.00 High  133.00     Consume a diet low in saturated fats, (fats that are solid at room temperature such as animal fat) and trans fats, using healthy fats if necessary, olive oil and canola oil are 2 examples.  Increasing daily fiber intake can be very important in controlling cholesterol elevation as well.  Weight loss, especially weight loss associated with exercise can significantly lower lipid levels.  During future visits, depending on response to dietary changes, medication or change in dosage if already on lipid lowering medications may be considered if lipid levels continue to be significantly elevated.    Orders:  -     Lipid Panel; Future; Expected date: 06/08/2024            Advance Care Planning     Date: 12/07/2023    Living Will  During this visit, I engaged the patient  in the voluntary advance " care planning process.  The patient and I reviewed the role for advance directives and their purpose in directing future healthcare if the patient's unable to speak for him/herself.  At this point in time, the patient does have full decision-making capacity.  We discussed different extreme health states that he could experience, and reviewed what kind of medical care he would want in those situations.  The patient communicated that if he were comatose and had little chance of a meaningful recovery, he would not want machines/life-sustaining treatments used. In addition to the above preference, other important end-of-life issues for the patient include no other issues.  Patient does have a living will/advanced care planning, will bring a copy to the clinic so that we can place it in the chart.  Patient may also be given the option to complete our advanced care planning paperwork so that we may enter it into the medical record today.  I spent a total of 5 minutes engaging the patient in this advance care planning discussion.              Current Outpatient Medications   Medication Instructions    celecoxib (CELEBREX) 200 mg, Oral, 2 times daily    lisinopriL-hydrochlorothiazide (PRINZIDE,ZESTORETIC) 20-12.5 mg per tablet 1 tablet, Oral, Daily        Follow up in about 6 months (around 6/13/2024) for Chronic condition F/up. In addition to their scheduled follow up, the patient has also been instructed to follow up on as needed basis.

## 2023-12-08 ENCOUNTER — OFFICE VISIT (OUTPATIENT)
Dept: PRIMARY CARE CLINIC | Facility: CLINIC | Age: 63
End: 2023-12-08
Payer: MEDICARE

## 2023-12-08 VITALS
HEART RATE: 60 BPM | RESPIRATION RATE: 18 BRPM | DIASTOLIC BLOOD PRESSURE: 83 MMHG | BODY MASS INDEX: 32.78 KG/M2 | WEIGHT: 229 LBS | HEIGHT: 70 IN | OXYGEN SATURATION: 100 % | SYSTOLIC BLOOD PRESSURE: 133 MMHG

## 2023-12-08 DIAGNOSIS — R94.4 DECREASED GFR: ICD-10-CM

## 2023-12-08 DIAGNOSIS — I10 PRIMARY HYPERTENSION: Chronic | ICD-10-CM

## 2023-12-08 DIAGNOSIS — Z12.5 SCREENING FOR PROSTATE CANCER: ICD-10-CM

## 2023-12-08 DIAGNOSIS — E78.5 DYSLIPIDEMIA: ICD-10-CM

## 2023-12-08 DIAGNOSIS — Z00.00 WELCOME TO MEDICARE PREVENTIVE VISIT: Primary | ICD-10-CM

## 2023-12-08 DIAGNOSIS — M25.562 CHRONIC PAIN OF LEFT KNEE: Chronic | ICD-10-CM

## 2023-12-08 DIAGNOSIS — E66.09 CLASS 1 OBESITY DUE TO EXCESS CALORIES WITH SERIOUS COMORBIDITY AND BODY MASS INDEX (BMI) OF 32.0 TO 32.9 IN ADULT: Chronic | ICD-10-CM

## 2023-12-08 DIAGNOSIS — G89.29 CHRONIC PAIN OF LEFT KNEE: Chronic | ICD-10-CM

## 2023-12-08 DIAGNOSIS — R79.89 ELEVATED SERUM CREATININE: Chronic | ICD-10-CM

## 2023-12-08 DIAGNOSIS — D53.9 MACROCYTIC ANEMIA: Chronic | ICD-10-CM

## 2023-12-08 PROCEDURE — G0402 PR WELCOME MEDICARE PREVENTIVE VISIT NEW ENROLLEE: ICD-10-PCS | Mod: ,,, | Performed by: GENERAL PRACTICE

## 2023-12-08 PROCEDURE — G0402 INITIAL PREVENTIVE EXAM: HCPCS | Mod: ,,, | Performed by: GENERAL PRACTICE

## 2023-12-08 RX ORDER — CELECOXIB 200 MG/1
200 CAPSULE ORAL 2 TIMES DAILY
Qty: 180 CAPSULE | Refills: 3 | Status: SHIPPED | OUTPATIENT
Start: 2023-12-08 | End: 2024-12-07

## 2023-12-08 RX ORDER — LISINOPRIL AND HYDROCHLOROTHIAZIDE 12.5; 2 MG/1; MG/1
1 TABLET ORAL DAILY
Qty: 90 TABLET | Refills: 3 | Status: SHIPPED | OUTPATIENT
Start: 2023-12-08 | End: 2024-12-07

## 2023-12-08 NOTE — PATIENT INSTRUCTIONS
For high cholesterol:  Consume a diet low in saturated fats, (fats that are solid at room temperature such as animal fat) and trans fats, using healthy fats if necessary, olive oil and canola oil are 2 examples.  Increasing daily fiber intake can be very important in controlling cholesterol elevation as well.  Weight loss, especially weight loss associated with exercise can significantly lower lipid levels.  During future visits, depending on response to dietary changes, medication or change in dosage if already on lipid lowering medications may be considered if lipid levels continue to be significantly elevated.      It has been determined that you have decreased kidney function, this may be a chronic condition, or it can improve over time depending on the medical situation.  Please follow the listed recommendations:  -Follow low sodium diet (2 grams a day)   -Control high blood pressure ( goal BP < 130/80, please record BP at home every day and bring log to next office visit to assure that home cuff is calibrated at minimum every 12 months)   -Exercise at least 30 minutes a day, 5 days a week.   -Maintain healthy weight.   -Stay well hydrated   -Receive Pneumovax, Flu, and HBV vaccines if indicated.   -Do not take NSAIDs on a regular basis, (Ibuprofen, Naproxen, Aleve, Advil, Toradol, Mobic), may take only Tylenol as needed for pain/headaches.   -Take cholesterol-lowering medications as prescribed (LDL goal <100)

## 2023-12-08 NOTE — ASSESSMENT & PLAN NOTE
Component Ref Range & Units 7 d ago 1 yr ago   Cholesterol Total <=200 mg/dL 225 High  218 High    HDL Cholesterol 35 - 60 mg/dL 41 50   Triglyceride 34 - 140 mg/dL 144 High  173 High    Cholesterol/HDL Ratio 0 - 5 5 4   Very Low Density Lipoprotein  29 35   LDL Cholesterol 50.00 - 140.00 mg/dL 155.00 High  133.00     Consume a diet low in saturated fats, (fats that are solid at room temperature such as animal fat) and trans fats, using healthy fats if necessary, olive oil and canola oil are 2 examples.  Increasing daily fiber intake can be very important in controlling cholesterol elevation as well.  Weight loss, especially weight loss associated with exercise can significantly lower lipid levels.  During future visits, depending on response to dietary changes, medication or change in dosage if already on lipid lowering medications may be considered if lipid levels continue to be significantly elevated.

## 2023-12-31 ENCOUNTER — PATIENT MESSAGE (OUTPATIENT)
Dept: PRIMARY CARE CLINIC | Facility: CLINIC | Age: 63
End: 2023-12-31
Payer: MEDICARE

## 2023-12-31 DIAGNOSIS — T75.3XXA MOTION SICKNESS, INITIAL ENCOUNTER: Primary | ICD-10-CM

## 2024-01-01 RX ORDER — SCOLOPAMINE TRANSDERMAL SYSTEM 1 MG/1
1 PATCH, EXTENDED RELEASE TRANSDERMAL
Qty: 4 PATCH | Refills: 0 | Status: SHIPPED | OUTPATIENT
Start: 2024-01-01

## 2024-06-12 ENCOUNTER — LAB VISIT (OUTPATIENT)
Dept: LAB | Facility: HOSPITAL | Age: 64
End: 2024-06-12
Attending: GENERAL PRACTICE
Payer: MEDICARE

## 2024-06-12 DIAGNOSIS — D53.9 MACROCYTIC ANEMIA: ICD-10-CM

## 2024-06-12 DIAGNOSIS — R94.4 DECREASED GFR: ICD-10-CM

## 2024-06-12 DIAGNOSIS — E78.5 DYSLIPIDEMIA: ICD-10-CM

## 2024-06-12 PROBLEM — N18.31 STAGE 3A CHRONIC KIDNEY DISEASE: Status: ACTIVE | Noted: 2024-06-12

## 2024-06-12 LAB
ALBUMIN SERPL-MCNC: 4.1 G/DL (ref 3.4–4.8)
ALBUMIN/GLOB SERPL: 1.1 RATIO (ref 1.1–2)
ALP SERPL-CCNC: 53 UNIT/L (ref 40–150)
ALT SERPL-CCNC: 27 UNIT/L (ref 0–55)
ANION GAP SERPL CALC-SCNC: 10 MEQ/L
AST SERPL-CCNC: 27 UNIT/L (ref 5–34)
BASOPHILS # BLD AUTO: 0.03 X10(3)/MCL
BASOPHILS NFR BLD AUTO: 0.6 %
BILIRUB SERPL-MCNC: 1.2 MG/DL
BUN SERPL-MCNC: 16 MG/DL (ref 8.4–25.7)
CALCIUM SERPL-MCNC: 9.6 MG/DL (ref 8.8–10)
CHLORIDE SERPL-SCNC: 105 MMOL/L (ref 98–107)
CHOLEST SERPL-MCNC: 244 MG/DL
CHOLEST/HDLC SERPL: 5 {RATIO} (ref 0–5)
CO2 SERPL-SCNC: 28 MMOL/L (ref 23–31)
CREAT SERPL-MCNC: 1.46 MG/DL (ref 0.73–1.18)
CREAT/UREA NIT SERPL: 11
EOSINOPHIL # BLD AUTO: 0.3 X10(3)/MCL (ref 0–0.9)
EOSINOPHIL NFR BLD AUTO: 5.8 %
ERYTHROCYTE [DISTWIDTH] IN BLOOD BY AUTOMATED COUNT: 12 % (ref 11.5–17)
GFR SERPLBLD CREATININE-BSD FMLA CKD-EPI: 53 ML/MIN/1.73/M2
GLOBULIN SER-MCNC: 3.7 GM/DL (ref 2.4–3.5)
GLUCOSE SERPL-MCNC: 114 MG/DL (ref 82–115)
HCT VFR BLD AUTO: 39.6 % (ref 42–52)
HDLC SERPL-MCNC: 47 MG/DL (ref 35–60)
HGB BLD-MCNC: 13 G/DL (ref 14–18)
IMM GRANULOCYTES # BLD AUTO: 0.01 X10(3)/MCL (ref 0–0.04)
IMM GRANULOCYTES NFR BLD AUTO: 0.2 %
LDLC SERPL CALC-MCNC: 170 MG/DL (ref 50–140)
LYMPHOCYTES # BLD AUTO: 2.01 X10(3)/MCL (ref 0.6–4.6)
LYMPHOCYTES NFR BLD AUTO: 38.6 %
MCH RBC QN AUTO: 31.9 PG (ref 27–31)
MCHC RBC AUTO-ENTMCNC: 32.8 G/DL (ref 33–36)
MCV RBC AUTO: 97.3 FL (ref 80–94)
MONOCYTES # BLD AUTO: 0.61 X10(3)/MCL (ref 0.1–1.3)
MONOCYTES NFR BLD AUTO: 11.7 %
NEUTROPHILS # BLD AUTO: 2.25 X10(3)/MCL (ref 2.1–9.2)
NEUTROPHILS NFR BLD AUTO: 43.1 %
NRBC BLD AUTO-RTO: 0 %
PLATELET # BLD AUTO: 208 X10(3)/MCL (ref 130–400)
PMV BLD AUTO: 11.2 FL (ref 7.4–10.4)
POTASSIUM SERPL-SCNC: 4.7 MMOL/L (ref 3.5–5.1)
PROT SERPL-MCNC: 7.8 GM/DL (ref 5.8–7.6)
RBC # BLD AUTO: 4.07 X10(6)/MCL (ref 4.7–6.1)
SODIUM SERPL-SCNC: 143 MMOL/L (ref 136–145)
TRIGL SERPL-MCNC: 137 MG/DL (ref 34–140)
VLDLC SERPL CALC-MCNC: 27 MG/DL
WBC # SPEC AUTO: 5.21 X10(3)/MCL (ref 4.5–11.5)

## 2024-06-12 PROCEDURE — 80061 LIPID PANEL: CPT

## 2024-06-12 PROCEDURE — 85025 COMPLETE CBC W/AUTO DIFF WBC: CPT

## 2024-06-12 PROCEDURE — 36415 COLL VENOUS BLD VENIPUNCTURE: CPT

## 2024-06-12 PROCEDURE — 80053 COMPREHEN METABOLIC PANEL: CPT

## 2024-06-12 NOTE — PROGRESS NOTES
"Subjective:       Patient ID: Hernando Will is a 64 y.o. male.    Chief Complaint: Hypertension      Patient presents to the clinic today for chronic condition follow-up.  Doing well, no specific complaints, tolerating all medications, reporting no significant side effects or problems.    Last wellness exam was  12/08/2023.    Chronic conditions being treated include but are not limited to primary HTN, dyslipidemia, stage IIIA CKD, microcytic anemia.      Review of Systems   Constitutional: Negative.  Negative for fatigue and fever.   HENT: Negative.  Negative for sore throat and trouble swallowing.    Eyes: Negative.  Negative for redness and visual disturbance.   Respiratory: Negative.  Negative for chest tightness and shortness of breath.    Cardiovascular: Negative.  Negative for chest pain.   Gastrointestinal: Negative.  Negative for abdominal pain, blood in stool and nausea.   Endocrine: Negative.  Negative for cold intolerance, heat intolerance and polyuria.   Genitourinary: Negative.    Musculoskeletal: Negative.  Negative for arthralgias, gait problem and joint swelling.   Integumentary:  Negative for rash. Negative.   Neurological: Negative.  Negative for dizziness, weakness and headaches.   Psychiatric/Behavioral: Negative.  Negative for agitation and dysphoric mood.            Patient Care Team:  Abraham Flowers MD as PCP - General (Family Medicine)  Sabino Kelly MD as Consulting Physician (Gastroenterology)  Naresh Aldridge MD as Consulting Physician (Orthopedic Surgery)  Objective:   Visit Vitals  BP (!) 159/90   Pulse 72   Resp 18   Ht 5' 10" (1.778 m)   Wt 98.9 kg (218 lb)   SpO2 98%   BMI 31.28 kg/m²        Physical Exam  Constitutional:       Appearance: He is obese.   HENT:      Head: Normocephalic.      Mouth/Throat:      Mouth: Mucous membranes are moist.      Pharynx: Oropharynx is clear.   Eyes:      Pupils: Pupils are equal, round, and reactive to light.   Cardiovascular:      Rate and " Rhythm: Normal rate and regular rhythm.   Pulmonary:      Effort: Pulmonary effort is normal.      Breath sounds: Normal breath sounds.   Abdominal:      Palpations: Abdomen is soft.   Musculoskeletal:         General: Normal range of motion.   Skin:     General: Skin is warm and dry.   Neurological:      General: No focal deficit present.      Mental Status: He is alert.   Psychiatric:         Mood and Affect: Mood normal.         Behavior: Behavior normal.         Thought Content: Thought content normal.         Judgment: Judgment normal.           Lab Results   Component Value Date     06/12/2024    K 4.7 06/12/2024     06/12/2024    CO2 28 06/12/2024    BUN 16.0 06/12/2024    CREATININE 1.46 (H) 06/12/2024    CALCIUM 9.6 06/12/2024    ALBUMIN 4.1 06/12/2024    BILITOT 1.2 06/12/2024    ALKPHOS 53 06/12/2024    AST 27 06/12/2024    ALT 27 06/12/2024    EGFRNORACEVR 53 06/12/2024     Lab Results   Component Value Date    WBC 5.21 06/12/2024    RBC 4.07 (L) 06/12/2024    HGB 13.0 (L) 06/12/2024    HCT 39.6 (L) 06/12/2024    MCV 97.3 (H) 06/12/2024    RDW 12.0 06/12/2024     06/12/2024      Lab Results   Component Value Date    CHOL 244 (H) 06/12/2024    TRIG 137 06/12/2024    HDL 47 06/12/2024    .00 (H) 06/12/2024    TOTALCHOLEST 5 06/12/2024     Lab Results   Component Value Date    TSH 1.997 12/01/2023     Lab Results   Component Value Date    HGBA1C 5.1 12/02/2022        Lab Results   Component Value Date    PSA 0.38 12/01/2023         Assessment:       ICD-10-CM ICD-9-CM   1. Primary hypertension  I10 401.9   2. Stage 3a chronic kidney disease  N18.31 585.3   3. Dyslipidemia  E78.5 272.4   4. Macrocytic anemia  D53.9 281.9   5. Class 1 obesity due to excess calories with serious comorbidity and body mass index (BMI) of 31.0 to 31.9 in adult  E66.09 278.00    Z68.31 V85.31   6. Screening for prostate cancer  Z12.5 V76.44       Current Outpatient Medications   Medication Instructions     celecoxib (CELEBREX) 200 mg, Oral, 2 times daily    rosuvastatin (CRESTOR) 10 mg, Oral, Daily    telmisartan-hydrochlorothiazide (MICARDIS HCT) 80-12.5 mg per tablet 1 tablet, Oral, Daily     Plan:     Problem List Items Addressed This Visit          Cardiac/Vascular    Primary hypertension - Primary (Chronic)    Overview     Prescribed telmisartan hydrochlorothiazide 80-12.5 mg daily.    Started on 06/13/2024 due to inadequate control of blood pressures.    Discontinued lisinopril hydrochlorothiazide 20-12.5 mg 06/13/2024.    Continue home blood pressure monitoring.         Relevant Medications    telmisartan-hydrochlorothiazide (MICARDIS HCT) 80-12.5 mg per tablet    Other Relevant Orders    Comprehensive Metabolic Panel    CBC Auto Differential    TSH    Dyslipidemia (Chronic)    Overview     Prescribed Crestor 10 mg daily.    Started on 06/13/2024, recheck lipid panel at next wellness.         Relevant Medications    rosuvastatin (CRESTOR) 10 MG tablet    Other Relevant Orders    Lipid Panel       Renal/    Stage 3a chronic kidney disease    Overview      Most recent GFR  53 ml/min/1.73.  Monitored regularly.    Patient given the following recommendations:  -Follow low sodium diet (2 grams a day)   -Control high blood pressure ( goal BP < 130/80, please record BP at home every day and bring log to next office visit to assure that home cuff is calibrated at minimum every 12 months)   -Exercise at least 30 minutes a day, 5 days a week.   -Maintain healthy weight.   -Stay well hydrated   -Receive Pneumovax, Flu, and HBV vaccines if indicated.   -Do not take NSAIDs (Ibuprofen, Naproxen, Aleve, Advil, Toradol, Mobic), may take only Tylenol as needed for pain/headaches.   -Take cholesterol-lowering medications as prescribed (LDL goal <100)         Current Assessment & Plan             Component Ref Range & Units 07:37  (6/12/24) 6 mo ago  (12/1/23) 1 yr ago  (4/18/23) 1 yr ago  (12/2/22)   Sodium 136 - 145 mmol/L  "143 141 139 140   Potassium 3.5 - 5.1 mmol/L 4.7 4.3 4.4 4.7   Chloride 98 - 107 mmol/L 105 108 High  104 106   CO2 23 - 31 mmol/L 28 28 29 27   Glucose 82 - 115 mg/dL 114 104 109 112   Blood Urea Nitrogen 8.4 - 25.7 mg/dL 16.0 16.7 15.4 22.8   Creatinine 0.73 - 1.18 mg/dL 1.46 High  1.37 High  1.23 High  1.29 High    Calcium 8.8 - 10.0 mg/dL 9.6 9.3 9.6 9.8   Protein Total 5.8 - 7.6 gm/dL 7.8 High  7.4 7.3 7.4   Albumin 3.4 - 4.8 g/dL 4.1 4.0 3.9 4.1 R   Globulin 2.4 - 3.5 gm/dL 3.7 High  3.4 3.4 3.3   Albumin/Globulin Ratio 1.1 - 2.0 ratio 1.1 1.2 1.1 1.2   Bilirubin Total <=1.5 mg/dL 1.2 0.8 0.9 0.8   ALP 40 - 150 unit/L 53 56 49 52   ALT 0 - 55 unit/L 27 22 23 24   AST 5 - 34 unit/L 27 20 22 21   eGFR mL/min/1.73/m2 53 57 R >60 R >60 R               Oncology    Macrocytic anemia (Chronic)    Overview     Stable macrocytic anemia, following annually.  Uncertain etiology.            Endocrine    Class 1 obesity due to excess calories with serious comorbidity and body mass index (BMI) of 31.0 to 31.9 in adult (Chronic)    Overview     Weight loss, healthy dietary choices, increased activity/exercise are recommended.  To lose weight, it is generally recommended to restrict calories to approximately 1500 calories per day to lose 1 lb per week, and approximately 1200 calories per day to lose up to 2 lb per week.  Generally, this is a healthy amount of weight to lose, and an appropriate amount of time to lose this amount of weight.  Adding exercise will assist in losing weight, particularly aerobic exercise such as walking.  However, resistance training, or lifting weights" over time may assistant weight loss by increasing basal metabolic rate, or the rate at which your body burns calories during periods of inactivity.    Keep track of BMI (body mass index), below 25 is considered normal, over 25 but below 30 is considered overweight, anything over 30 is considered moderately obese, over 35 severely obese, and over 40 " is characterized as morbidly obese.          Other Visit Diagnoses       Screening for prostate cancer        This diagnosis does not apply to this visit, appropriate prostate cancer screening will be ordered for next visit/wellness exam.    Relevant Orders    PSA, Screening                  Blood pressure recheck in two months  Follow up in about 6 months (around 12/17/2024) for Medicare Wellness.

## 2024-06-12 NOTE — ASSESSMENT & PLAN NOTE
Component Ref Range & Units 07:37  (6/12/24) 6 mo ago  (12/1/23) 1 yr ago  (4/18/23) 1 yr ago  (12/2/22)   Sodium 136 - 145 mmol/L 143 141 139 140   Potassium 3.5 - 5.1 mmol/L 4.7 4.3 4.4 4.7   Chloride 98 - 107 mmol/L 105 108 High  104 106   CO2 23 - 31 mmol/L 28 28 29 27   Glucose 82 - 115 mg/dL 114 104 109 112   Blood Urea Nitrogen 8.4 - 25.7 mg/dL 16.0 16.7 15.4 22.8   Creatinine 0.73 - 1.18 mg/dL 1.46 High  1.37 High  1.23 High  1.29 High    Calcium 8.8 - 10.0 mg/dL 9.6 9.3 9.6 9.8   Protein Total 5.8 - 7.6 gm/dL 7.8 High  7.4 7.3 7.4   Albumin 3.4 - 4.8 g/dL 4.1 4.0 3.9 4.1 R   Globulin 2.4 - 3.5 gm/dL 3.7 High  3.4 3.4 3.3   Albumin/Globulin Ratio 1.1 - 2.0 ratio 1.1 1.2 1.1 1.2   Bilirubin Total <=1.5 mg/dL 1.2 0.8 0.9 0.8   ALP 40 - 150 unit/L 53 56 49 52   ALT 0 - 55 unit/L 27 22 23 24   AST 5 - 34 unit/L 27 20 22 21   eGFR mL/min/1.73/m2 53 57 R >60 R >60 R

## 2024-06-13 ENCOUNTER — OFFICE VISIT (OUTPATIENT)
Dept: PRIMARY CARE CLINIC | Facility: CLINIC | Age: 64
End: 2024-06-13
Payer: MEDICARE

## 2024-06-13 VITALS
DIASTOLIC BLOOD PRESSURE: 90 MMHG | HEART RATE: 72 BPM | OXYGEN SATURATION: 98 % | RESPIRATION RATE: 18 BRPM | BODY MASS INDEX: 31.21 KG/M2 | HEIGHT: 70 IN | SYSTOLIC BLOOD PRESSURE: 159 MMHG | WEIGHT: 218 LBS

## 2024-06-13 DIAGNOSIS — E66.09 CLASS 1 OBESITY DUE TO EXCESS CALORIES WITH SERIOUS COMORBIDITY AND BODY MASS INDEX (BMI) OF 31.0 TO 31.9 IN ADULT: Chronic | ICD-10-CM

## 2024-06-13 DIAGNOSIS — N18.31 STAGE 3A CHRONIC KIDNEY DISEASE: ICD-10-CM

## 2024-06-13 DIAGNOSIS — I10 PRIMARY HYPERTENSION: Primary | Chronic | ICD-10-CM

## 2024-06-13 DIAGNOSIS — Z12.5 SCREENING FOR PROSTATE CANCER: ICD-10-CM

## 2024-06-13 DIAGNOSIS — E78.5 DYSLIPIDEMIA: Chronic | ICD-10-CM

## 2024-06-13 DIAGNOSIS — D53.9 MACROCYTIC ANEMIA: Chronic | ICD-10-CM

## 2024-06-13 PROCEDURE — 1160F RVW MEDS BY RX/DR IN RCRD: CPT | Mod: CPTII,,, | Performed by: GENERAL PRACTICE

## 2024-06-13 PROCEDURE — 1159F MED LIST DOCD IN RCRD: CPT | Mod: CPTII,,, | Performed by: GENERAL PRACTICE

## 2024-06-13 PROCEDURE — 99214 OFFICE O/P EST MOD 30 MIN: CPT | Mod: ,,, | Performed by: GENERAL PRACTICE

## 2024-06-13 PROCEDURE — 4010F ACE/ARB THERAPY RXD/TAKEN: CPT | Mod: CPTII,,, | Performed by: GENERAL PRACTICE

## 2024-06-13 PROCEDURE — 3080F DIAST BP >= 90 MM HG: CPT | Mod: CPTII,,, | Performed by: GENERAL PRACTICE

## 2024-06-13 PROCEDURE — 3077F SYST BP >= 140 MM HG: CPT | Mod: CPTII,,, | Performed by: GENERAL PRACTICE

## 2024-06-13 PROCEDURE — 3008F BODY MASS INDEX DOCD: CPT | Mod: CPTII,,, | Performed by: GENERAL PRACTICE

## 2024-06-13 RX ORDER — ROSUVASTATIN CALCIUM 10 MG/1
10 TABLET, COATED ORAL DAILY
Qty: 90 TABLET | Refills: 3 | Status: SHIPPED | OUTPATIENT
Start: 2024-06-13 | End: 2025-06-13

## 2024-06-13 RX ORDER — TELMISARTAN AND HYDROCHLORTHIAZIDE 80; 12.5 MG/1; MG/1
1 TABLET ORAL DAILY
Qty: 90 TABLET | Refills: 3 | Status: SHIPPED | OUTPATIENT
Start: 2024-06-13 | End: 2025-06-13

## 2024-06-13 NOTE — PATIENT INSTRUCTIONS
It has been determined that you have decreased kidney function, this may be a chronic condition, or it can improve over time depending on the medical situation.  Please follow the listed recommendations:  -Follow low sodium diet (2 grams a day)   -Control high blood pressure ( goal BP < 130/80, please record BP at home every day and bring log to next office visit to assure that home cuff is calibrated at minimum every 12 months)   -Exercise at least 30 minutes a day, 5 days a week.   -Maintain healthy weight.   -Stay well hydrated   -Receive Pneumovax, Flu, and HBV vaccines if indicated.   -Do not take NSAIDs (Ibuprofen, Naproxen, Aleve, Advil, Toradol, Mobic), may take only Tylenol as needed for pain/headaches.   -Take cholesterol-lowering medications as prescribed (LDL goal <100)

## 2024-08-21 ENCOUNTER — TELEPHONE (OUTPATIENT)
Dept: PRIMARY CARE CLINIC | Facility: CLINIC | Age: 64
End: 2024-08-21

## 2024-08-21 ENCOUNTER — DOCUMENTATION ONLY (OUTPATIENT)
Dept: PRIMARY CARE CLINIC | Facility: CLINIC | Age: 64
End: 2024-08-21

## 2024-08-21 NOTE — TELEPHONE ENCOUNTER
----- Message from Veronica Boateng sent at 8/21/2024  2:35 PM CDT -----  Who Called: Hernando Will    Caller is requesting a sooner appointment. Caller declined first available appointment listed below. Caller will not accept being placed on the waitlist and is requesting a message be sent to doctor.    When is the first available appointment?nov 12  Options offered (Virtual Visit, Urgent Care):   Symptoms:    Patient's Preferred Phone Number on File: [phone]   Best Call Back Number, if different:  Additional Information: pt had missed appt today for 2 mth fu, pt would like to determine if he can r/s . Sooner than next avail in November.

## 2024-08-23 ENCOUNTER — OFFICE VISIT (OUTPATIENT)
Dept: URGENT CARE | Facility: CLINIC | Age: 64
End: 2024-08-23
Payer: MEDICARE

## 2024-08-23 VITALS
SYSTOLIC BLOOD PRESSURE: 165 MMHG | WEIGHT: 219 LBS | HEART RATE: 62 BPM | RESPIRATION RATE: 18 BRPM | DIASTOLIC BLOOD PRESSURE: 80 MMHG | BODY MASS INDEX: 31.35 KG/M2 | TEMPERATURE: 98 F | HEIGHT: 70 IN | OXYGEN SATURATION: 98 %

## 2024-08-23 DIAGNOSIS — H18.892 CORNEAL IRRITATION OF LEFT EYE: Primary | ICD-10-CM

## 2024-08-23 PROCEDURE — 99213 OFFICE O/P EST LOW 20 MIN: CPT | Mod: ,,, | Performed by: NURSE PRACTITIONER

## 2024-08-23 RX ORDER — OFLOXACIN 3 MG/ML
2 SOLUTION/ DROPS OPHTHALMIC 4 TIMES DAILY
Qty: 10 ML | Refills: 0 | Status: SHIPPED | OUTPATIENT
Start: 2024-08-23 | End: 2024-08-30

## 2024-08-23 NOTE — PROGRESS NOTES
"Subjective:      Patient ID: Hernando Will is a 64 y.o. male.    Vitals:  height is 5' 10" (1.778 m) and weight is 99.3 kg (219 lb). His temperature is 98.2 °F (36.8 °C). His blood pressure is 165/80 (abnormal) and his pulse is 62. His respiration is 18 and oxygen saturation is 98%.     Chief Complaint: Eye Problem     Patient is a 64 y.o. male who presents to urgent care with complaints of left eye scratchy since last night. Alleviating factors include eye drops with no relief. Patient denies trauma.      ROS   Objective:     Physical Exam   Constitutional: He is oriented to person, place, and time. He appears well-developed. He is cooperative.  Non-toxic appearance. He does not appear ill. No distress.   HENT:   Head: Normocephalic and atraumatic.   Ears:   Right Ear: Hearing, tympanic membrane, external ear and ear canal normal.   Left Ear: Hearing, tympanic membrane, external ear and ear canal normal.   Nose: Nose normal. No mucosal edema, rhinorrhea or nasal deformity. No epistaxis. Right sinus exhibits no maxillary sinus tenderness and no frontal sinus tenderness. Left sinus exhibits no maxillary sinus tenderness and no frontal sinus tenderness.   Mouth/Throat: Uvula is midline, oropharynx is clear and moist and mucous membranes are normal. No trismus in the jaw. Normal dentition. No uvula swelling. No oropharyngeal exudate, posterior oropharyngeal edema or posterior oropharyngeal erythema.   Eyes: Conjunctivae and lids are normal. No scleral icterus.   Neck: Trachea normal and phonation normal. Neck supple. No edema present. No erythema present. No neck rigidity present.   Cardiovascular: Normal rate, regular rhythm, normal heart sounds and normal pulses.   Pulmonary/Chest: Effort normal and breath sounds normal. No respiratory distress. He has no decreased breath sounds. He has no rhonchi.   Abdominal: Normal appearance.   Musculoskeletal: Normal range of motion.         General: No deformity. Normal " range of motion.   Neurological: He is alert and oriented to person, place, and time. He exhibits normal muscle tone. Coordination normal.   Skin: Skin is warm, dry, intact, not diaphoretic and not pale.   Psychiatric: His speech is normal and behavior is normal. Judgment and thought content normal.   Nursing note and vitals reviewed.    Area was apply with tetracaine and fluorescein using slit-lamp no abnormal findings of corneal abrasion were noted but questionable non ulcerative [place] noted on the right aspect of the cornea.  Patient denies any other current issues we will cover well ofloxacin  Assessment:     1. Corneal irritation of left eye        Plan:   Discussed findings with the patient we will follow up with Optometry on Monday if symptoms not improve   Ofloxacin sent to pharmacy   Discussed strict ER precautions if any of his symptoms worsened over the weekend.    Corneal irritation of left eye    Other orders  -     ofloxacin (OCUFLOX) 0.3 % ophthalmic solution; Place 2 drops into both eyes 4 (four) times daily. for 7 days  Dispense: 10 mL; Refill: 0

## 2024-10-02 ENCOUNTER — OFFICE VISIT (OUTPATIENT)
Dept: PRIMARY CARE CLINIC | Facility: CLINIC | Age: 64
End: 2024-10-02
Payer: MEDICARE

## 2024-10-02 VITALS
HEART RATE: 59 BPM | DIASTOLIC BLOOD PRESSURE: 88 MMHG | WEIGHT: 223 LBS | SYSTOLIC BLOOD PRESSURE: 162 MMHG | BODY MASS INDEX: 31.92 KG/M2 | HEIGHT: 70 IN

## 2024-10-02 DIAGNOSIS — I10 PRIMARY HYPERTENSION: Primary | Chronic | ICD-10-CM

## 2024-10-02 RX ORDER — AMLODIPINE AND BENAZEPRIL HYDROCHLORIDE 5; 40 MG/1; MG/1
1 CAPSULE ORAL DAILY
Qty: 90 CAPSULE | Refills: 3 | Status: SHIPPED | OUTPATIENT
Start: 2024-10-02 | End: 2025-10-02

## 2024-10-02 NOTE — PROGRESS NOTES
"Subjective:       Patient ID: Hernando Will is a 64 y.o. male.    Chief Complaint: Follow-up    Established patient, returns to the clinic today for blood pressure recheck.  Prescribed telmisartan hydrochlorothiazide 80-12.5 mg daily.  Blood pressures at home continue to be mildly elevated.  However, after taking the medication for couple of months, started to have some ED symptoms, possibly side effects from the medication.      Review of Systems   Constitutional: Negative.  Negative for fatigue and fever.   HENT: Negative.  Negative for sore throat and trouble swallowing.    Eyes: Negative.  Negative for redness and visual disturbance.   Respiratory: Negative.  Negative for chest tightness and shortness of breath.    Cardiovascular: Negative.  Negative for chest pain.   Gastrointestinal: Negative.  Negative for abdominal pain, blood in stool and nausea.   Endocrine: Negative.  Negative for cold intolerance, heat intolerance and polyuria.   Genitourinary: Negative.    Musculoskeletal: Negative.  Negative for arthralgias, gait problem and joint swelling.   Integumentary:  Negative for rash. Negative.   Neurological: Negative.  Negative for dizziness, weakness and headaches.   Psychiatric/Behavioral: Negative.  Negative for agitation and dysphoric mood.            Patient Care Team:  Abraham Flowers MD as PCP - General (Family Medicine)  Sabino Kelly MD as Consulting Physician (Gastroenterology)  Naresh Aldridge MD as Consulting Physician (Orthopedic Surgery)  Objective:   Visit Vitals  BP (!) 162/88 (Patient Position: Sitting)   Pulse (!) 59   Ht 5' 10" (1.778 m)   Wt 101.2 kg (223 lb)   BMI 32.00 kg/m²        Physical Exam  Constitutional:       Appearance: He is obese.   HENT:      Head: Normocephalic.   Eyes:      Conjunctiva/sclera: Conjunctivae normal.   Cardiovascular:      Rate and Rhythm: Normal rate and regular rhythm.   Pulmonary:      Effort: Pulmonary effort is normal.      Breath sounds: Normal " breath sounds.   Abdominal:      Palpations: Abdomen is soft.   Musculoskeletal:         General: Normal range of motion.   Skin:     General: Skin is warm and dry.   Neurological:      General: No focal deficit present.      Mental Status: He is alert. Mental status is at baseline.   Psychiatric:         Mood and Affect: Mood normal.         Behavior: Behavior normal.         Thought Content: Thought content normal.           Lab Results   Component Value Date     06/12/2024    K 4.7 06/12/2024     06/12/2024    CO2 28 06/12/2024    BUN 16.0 06/12/2024    CREATININE 1.46 (H) 06/12/2024    CALCIUM 9.6 06/12/2024    ALBUMIN 4.1 06/12/2024    BILITOT 1.2 06/12/2024    ALKPHOS 53 06/12/2024    AST 27 06/12/2024    ALT 27 06/12/2024    EGFRNORACEVR 53 06/12/2024     Lab Results   Component Value Date    WBC 5.21 06/12/2024    RBC 4.07 (L) 06/12/2024    HGB 13.0 (L) 06/12/2024    HCT 39.6 (L) 06/12/2024    MCV 97.3 (H) 06/12/2024    RDW 12.0 06/12/2024     06/12/2024      Lab Results   Component Value Date    CHOL 244 (H) 06/12/2024    TRIG 137 06/12/2024    HDL 47 06/12/2024    .00 (H) 06/12/2024    TOTALCHOLEST 5 06/12/2024     Lab Results   Component Value Date    TSH 1.997 12/01/2023     Lab Results   Component Value Date    HGBA1C 5.1 12/02/2022        Lab Results   Component Value Date    PSA 0.38 12/01/2023         Assessment:       ICD-10-CM ICD-9-CM   1. Primary hypertension  I10 401.9       Current Outpatient Medications   Medication Instructions    amLODIPine-benazepriL (LOTREL) 5-40 mg per capsule 1 capsule, Oral, Daily    celecoxib (CELEBREX) 200 mg, Oral, 2 times daily    rosuvastatin (CRESTOR) 10 mg, Oral, Daily     Plan:     Problem List Items Addressed This Visit          Cardiac/Vascular    Primary hypertension - Primary (Chronic)    Overview     Prescribed amlodipine benazepril 5-40 mg daily.    Stopped telmisartan hydrochlorothiazide, causing some ED.    Continue blood  pressure checks at home, keep next appointment in December, next wellness.         Relevant Medications    amLODIPine-benazepriL (LOTREL) 5-40 mg per capsule               Follow up for next appointment as scheduled or as needed.    This note was created with the assistance of [email or web address] voice recognition software or phone dictation. There may be transcription errors as a result of using this technology. Effort has been made to assure accuracy of transcription but any obvious errors or omissions should be clarified with the author of the document.

## 2024-12-17 ENCOUNTER — LAB VISIT (OUTPATIENT)
Dept: LAB | Facility: HOSPITAL | Age: 64
End: 2024-12-17
Attending: GENERAL PRACTICE
Payer: MEDICARE

## 2024-12-17 DIAGNOSIS — Z12.5 SCREENING FOR PROSTATE CANCER: ICD-10-CM

## 2024-12-17 DIAGNOSIS — E78.5 DYSLIPIDEMIA: Chronic | ICD-10-CM

## 2024-12-17 DIAGNOSIS — I10 PRIMARY HYPERTENSION: ICD-10-CM

## 2024-12-17 PROBLEM — M17.12 PRIMARY OSTEOARTHRITIS OF LEFT KNEE: Status: ACTIVE | Noted: 2024-12-17

## 2024-12-17 PROBLEM — M17.12 PRIMARY OSTEOARTHRITIS OF LEFT KNEE: Chronic | Status: ACTIVE | Noted: 2024-12-17

## 2024-12-17 LAB
ALBUMIN SERPL-MCNC: 3.9 G/DL (ref 3.4–4.8)
ALBUMIN/GLOB SERPL: 1.2 RATIO (ref 1.1–2)
ALP SERPL-CCNC: 57 UNIT/L (ref 40–150)
ALT SERPL-CCNC: 17 UNIT/L (ref 0–55)
ANION GAP SERPL CALC-SCNC: 6 MEQ/L
AST SERPL-CCNC: 19 UNIT/L (ref 5–34)
BASOPHILS # BLD AUTO: 0.03 X10(3)/MCL
BASOPHILS NFR BLD AUTO: 0.6 %
BILIRUB SERPL-MCNC: 0.9 MG/DL
BUN SERPL-MCNC: 16.4 MG/DL (ref 8.4–25.7)
CALCIUM SERPL-MCNC: 9 MG/DL (ref 8.8–10)
CHLORIDE SERPL-SCNC: 109 MMOL/L (ref 98–107)
CHOLEST SERPL-MCNC: 159 MG/DL
CHOLEST/HDLC SERPL: 4 {RATIO} (ref 0–5)
CO2 SERPL-SCNC: 28 MMOL/L (ref 23–31)
CREAT SERPL-MCNC: 1.35 MG/DL (ref 0.72–1.25)
CREAT/UREA NIT SERPL: 12
EOSINOPHIL # BLD AUTO: 0.28 X10(3)/MCL (ref 0–0.9)
EOSINOPHIL NFR BLD AUTO: 5.6 %
ERYTHROCYTE [DISTWIDTH] IN BLOOD BY AUTOMATED COUNT: 11.6 % (ref 11.5–17)
GFR SERPLBLD CREATININE-BSD FMLA CKD-EPI: 58 ML/MIN/1.73/M2
GLOBULIN SER-MCNC: 3.2 GM/DL (ref 2.4–3.5)
GLUCOSE SERPL-MCNC: 108 MG/DL (ref 82–115)
HCT VFR BLD AUTO: 38.4 % (ref 42–52)
HDLC SERPL-MCNC: 45 MG/DL (ref 35–60)
HGB BLD-MCNC: 12.8 G/DL (ref 14–18)
IMM GRANULOCYTES # BLD AUTO: 0.01 X10(3)/MCL (ref 0–0.04)
IMM GRANULOCYTES NFR BLD AUTO: 0.2 %
LDLC SERPL CALC-MCNC: 91 MG/DL (ref 50–140)
LYMPHOCYTES # BLD AUTO: 2.55 X10(3)/MCL (ref 0.6–4.6)
LYMPHOCYTES NFR BLD AUTO: 51.1 %
MCH RBC QN AUTO: 32.1 PG (ref 27–31)
MCHC RBC AUTO-ENTMCNC: 33.3 G/DL (ref 33–36)
MCV RBC AUTO: 96.2 FL (ref 80–94)
MONOCYTES # BLD AUTO: 0.51 X10(3)/MCL (ref 0.1–1.3)
MONOCYTES NFR BLD AUTO: 10.2 %
NEUTROPHILS # BLD AUTO: 1.61 X10(3)/MCL (ref 2.1–9.2)
NEUTROPHILS NFR BLD AUTO: 32.3 %
NRBC BLD AUTO-RTO: 0 %
PLATELET # BLD AUTO: 197 X10(3)/MCL (ref 130–400)
PMV BLD AUTO: 11 FL (ref 7.4–10.4)
POTASSIUM SERPL-SCNC: 4.2 MMOL/L (ref 3.5–5.1)
PROT SERPL-MCNC: 7.1 GM/DL (ref 5.8–7.6)
PSA SERPL-MCNC: 0.38 NG/ML
RBC # BLD AUTO: 3.99 X10(6)/MCL (ref 4.7–6.1)
SODIUM SERPL-SCNC: 143 MMOL/L (ref 136–145)
TRIGL SERPL-MCNC: 114 MG/DL (ref 34–140)
TSH SERPL-ACNC: 1.69 UIU/ML (ref 0.35–4.94)
VLDLC SERPL CALC-MCNC: 23 MG/DL
WBC # BLD AUTO: 4.99 X10(3)/MCL (ref 4.5–11.5)

## 2024-12-17 PROCEDURE — 80053 COMPREHEN METABOLIC PANEL: CPT

## 2024-12-17 PROCEDURE — 84443 ASSAY THYROID STIM HORMONE: CPT

## 2024-12-17 PROCEDURE — 84153 ASSAY OF PSA TOTAL: CPT

## 2024-12-17 PROCEDURE — 85025 COMPLETE CBC W/AUTO DIFF WBC: CPT

## 2024-12-17 PROCEDURE — 36415 COLL VENOUS BLD VENIPUNCTURE: CPT

## 2024-12-17 PROCEDURE — 80061 LIPID PANEL: CPT

## 2024-12-17 NOTE — PROGRESS NOTES
Patient ID: [id number]     Chief Complaint: Annual Exam      HPI:     Hernando Will is a 64 y.o. male here today for a Medicare Wellness. No other complaints today.       -------------------------------------    Hypertension    Personal history of colonic polyps    COLONOSCOPY        Past Surgical History:   Procedure Laterality Date    COLONOSCOPY  05/11/2022    Sabino Kelly MD       Review of patient's allergies indicates:  No Known Allergies    Outpatient Medications Marked as Taking for the 12/18/24 encounter (Office Visit) with Abraham Flowers MD   Medication Sig Dispense Refill    amLODIPine-benazepriL (LOTREL) 5-40 mg per capsule Take 1 capsule by mouth once daily. 90 capsule 3    rosuvastatin (CRESTOR) 10 MG tablet Take 1 tablet (10 mg total) by mouth once daily. 90 tablet 3       Social History     Socioeconomic History    Marital status:    Tobacco Use    Smoking status: Never    Smokeless tobacco: Never   Substance and Sexual Activity    Alcohol use: Yes     Alcohol/week: 1.0 standard drink of alcohol     Types: 1 Cans of beer per week    Drug use: Never    Sexual activity: Yes     Partners: Female     Social Drivers of Health     Financial Resource Strain: Low Risk  (6/12/2024)    Overall Financial Resource Strain (CARDIA)     Difficulty of Paying Living Expenses: Not hard at all   Food Insecurity: No Food Insecurity (6/12/2024)    Hunger Vital Sign     Worried About Running Out of Food in the Last Year: Never true     Ran Out of Food in the Last Year: Never true   Physical Activity: Insufficiently Active (6/12/2024)    Exercise Vital Sign     Days of Exercise per Week: 4 days     Minutes of Exercise per Session: 30 min   Stress: No Stress Concern Present (6/12/2024)    [employer] - Occupational Stress Questionnaire     Feeling of Stress : Not at all   Housing Stability: Unknown (6/12/2024)    Housing Stability Vital Sign     Unable to Pay for Housing in the Last  Year: No        Family History   Problem Relation Name Age of Onset    Emphysema Mother April Will     Diabetes Mother April Will     COPD Mother April Will     Throat cancer Father Gonsalo Will     Cancer Father Gonsalo Will         Throat cancer        Patient Care Team:  Abraham Flowers MD as PCP - General (Family Medicine)  Sabino Kelly MD as Consulting Physician (Gastroenterology)  Naresh Aldridge MD as Consulting Physician (Orthopedic Surgery)     Opioid Screening: Patient medication list reviewed, patient is not taking prescription opioids. Patient is not using additional opioids than prescribed. Patient is at low risk of substance abuse based on this opioid use history.       Subjective:     Review of Systems   Constitutional: Negative.  Negative for malaise/fatigue and weight loss.   HENT: Negative.     Eyes: Negative.    Respiratory: Negative.  Negative for shortness of breath.    Cardiovascular: Negative.  Negative for chest pain.   Gastrointestinal: Negative.    Genitourinary: Negative.    Musculoskeletal: Negative.    Skin: Negative.    Neurological: Negative.  Negative for focal weakness, weakness and headaches.   Endo/Heme/Allergies: Negative.    Psychiatric/Behavioral: Negative.  Negative for depression and memory loss. The patient is not nervous/anxious.          Patient Reported Health Risk Assessment  What is your age?: 65-69  Are you male or female?: Male  During the past four weeks, how much have you been bothered by emotional problems such as feeling anxious, depressed, irritable, sad, or downhearted and blue?: Not at all  During the past five weeks, has your physical and/or emotional health limited your social activities with family, friends, neighbors, or groups?: Not at all  During the past four weeks, how much bodily pain have you generally had?: No pain  During the past four weeks, was someone available to help if you needed and wanted help?: Yes, as much as I  wanted  During the past four weeks, what was the hardest physical activity you could do for at least two minutes?: Light  Can you get to places out of walking distance without help?  (For example, can you travel alone on buses or taxis, or drive your own car?): Yes  Can you go shopping for groceries or clothes without someone's help?: Yes  Can you prepare your own meals?: Yes  Can you do your own housework without help?: Yes  Because of any health problems, do you need the help of another person with your personal care needs such as eating, bathing, dressing, or getting around the house?: No  Can you handle your own money without help?: Yes  During the past four weeks, how would you rate your health in general?: Good  How have things been going for you during the past four weeks?: Pretty well  Are you having difficulties driving your car?: No  Do you always fasten your seat belt when you are in a car?: Yes, usually  How often in the past four weeks have you been bothered by falling or dizzy when standing up?: Never  How often in the past four weeks have you been bothered by sexual problems?: Never  How often in the past four weeks have you been bothered by trouble eating well?: Never  How often in the past four weeks have you been bothered by teeth or denture problems?: Never  How often in the past four weeks have you been bothered with problems using the telephone?: Never  How often in the past four weeks have you been bothered by tiredness or fatigue?: Never  Have you fallen two or more times in the past year?: No  Are you afraid of falling?: No  Are you a smoker?: No  During the past four weeks, how many drinks of wine, beer, or other alcoholic beverages did you have?: One drink or less per week  Do you exercise for about 20 minutes three or more days a week?: No, I usually do not exercise this much  Have you been given any information to help you with hazards in your house that might hurt you?: No  Have you been  "given any information to help you with keeping track of your medications?: No  How often do you have trouble taking medicines the way you've been told to take them?: I always take them as prescribed  How confident are you that you can control and manage most of your health problems?: Very confident  What is your race? (Check all that apply.):     Objective:     /76   Pulse 70   Resp 18   Ht 5' 10" (1.778 m)   Wt 100.2 kg (221 lb)   SpO2 99%   BMI 31.71 kg/m²     Physical Exam  Constitutional:       Appearance: He is obese.   HENT:      Head: Normocephalic.      Mouth/Throat:      Mouth: Mucous membranes are moist.      Pharynx: Oropharynx is clear.   Eyes:      Pupils: Pupils are equal, round, and reactive to light.   Cardiovascular:      Rate and Rhythm: Normal rate and regular rhythm.   Pulmonary:      Effort: Pulmonary effort is normal.      Breath sounds: Normal breath sounds.   Abdominal:      Palpations: Abdomen is soft.   Musculoskeletal:         General: Normal range of motion.   Skin:     General: Skin is warm and dry.   Neurological:      General: No focal deficit present.      Mental Status: He is alert.   Psychiatric:         Mood and Affect: Mood normal.         Behavior: Behavior normal.         Thought Content: Thought content normal.         Judgment: Judgment normal.         Lab Results   Component Value Date     12/17/2024    K 4.2 12/17/2024     (H) 12/17/2024    CO2 28 12/17/2024    BUN 16.4 12/17/2024    CREATININE 1.35 (H) 12/17/2024    CALCIUM 9.0 12/17/2024    ALBUMIN 3.9 12/17/2024    BILITOT 0.9 12/17/2024    ALKPHOS 57 12/17/2024    AST 19 12/17/2024    ALT 17 12/17/2024    EGFRNORACEVR 58 12/17/2024     Lab Results   Component Value Date    WBC 4.99 12/17/2024    RBC 3.99 (L) 12/17/2024    HGB 12.8 (L) 12/17/2024    HCT 38.4 (L) 12/17/2024    MCV 96.2 (H) 12/17/2024    RDW 11.6 12/17/2024     12/17/2024      Lab Results   Component Value Date "    CHOL 159 12/17/2024    TRIG 114 12/17/2024    HDL 45 12/17/2024    LDL 91.00 12/17/2024    TOTALCHOLEST 4 12/17/2024     Lab Results   Component Value Date    TSH 1.690 12/17/2024     Lab Results   Component Value Date    HGBA1C 5.1 12/02/2022        Lab Results   Component Value Date    PSA 0.38 12/17/2024              No data to display                  12/18/2024    10:30 AM 8/23/2024     6:55 PM 12/8/2023     9:00 AM 6/1/2022     8:30 AM   Fall Risk Assessment - Outpatient   Mobility Status Ambulatory Ambulatory Ambulatory Ambulatory   Number of falls 0 0 0 0   Identified as fall risk False False False False           Depression Screening  Over the past two weeks, has the patient felt down, depressed, or hopeless?: No  Over the past two weeks, has the patient felt little interest or pleasure in doing things?: No  Functional Ability/Safety Screening  Was the patient's timed Up & Go test unsteady or longer than 30 seconds?: No  Does the patient need help with phone, transportation, shopping, preparing meals, housework, laundry, meds, or managing money?: No  Does the patient's home have rugs in the hallway, lack grab bars in the bathroom, lack handrails on the stairs or have poor lighting?: No  Have you noticed any hearing difficulties?: No  Cognitive Function (Assessed through direct observation with due consideration of information obtained by way of patient reports and/or concerns raised by family, friends, caretakers, or others)    Does the patient repeat questions/statements in the same day?: No  Does the patient have trouble remembering the date, year, and time?: No  Does the patient have difficulty managing finances?: No  Does the patient have a decreased sense of direction?: No  Assessment:       ICD-10-CM ICD-9-CM   1. Medicare annual wellness visit, subsequent  Z00.00 V70.0   2. Screening for prostate cancer  Z12.5 V76.44   3. Primary hypertension  I10 401.9   4. Stage 3a chronic kidney disease  N18.31  585.3   5. Dyslipidemia  E78.5 272.4   6. Macrocytic anemia  D53.9 281.9   7. Chronic pain of left knee  M25.562 719.46    G89.29 338.29   8. Primary osteoarthritis of left knee  M17.12 715.16   9. Class 1 obesity due to excess calories with serious comorbidity and body mass index (BMI) of 31.0 to 31.9 in adult  E66.811 278.00    E66.09 V85.31    Z68.31         Plan:     Medicare Annual Wellness and Personalized Prevention Plan:   Fall Risk + Home Safety + Hearing Impairment + Depression Screen + Cognitive Impairment Screen + Health Risk Assessment all reviewed.       Health Maintenance Topics with due status: Not Due       Topic Last Completion Date    Colorectal Cancer Screening 05/11/2022    Hemoglobin A1c (Diabetic Prevention Screening) 12/02/2022    Lipid Panel 12/17/2024      The patient's Health Maintenance was reviewed and the following appears to be due at this time:   Health Maintenance Due   Topic Date Due    Annual UACr  Never done    Shingles Vaccine (1 of 2) Never done    RSV Vaccine (Age 60+ and Pregnant patients) (1 - Risk 60-74 years 1-dose series) Never done    Pneumococcal Vaccines (Age 65+) (1 of 1 - PCV) Never done     Health risk assessment:  After review of the patient's medical record as it relates to health risk assessment over the next 5-10 years per recommendations of the USPSTF, it was determined that all pertinent recommendations have been appropriately addressed. The patient does not desire any further preventative care measures or screening tests at this time.  We will continue to reassess at each annual visit.    1. Medicare annual wellness visit, subsequent  Comments:  Overall health status was reviewed.  Good health habits reinforced.  Annual wellness exams recommended.    2. Screening for prostate cancer  Comments:  Prostate specific antigen blood test obtained was essentially normal, suggesting that there is no current concern for prostate cancer.  Digital exam deferred.    3.  Primary hypertension  Overview:  Prescribed amlodipine benazepril 5-40 mg daily.    Possible elevated blood pressures, enrolled in digital blood pressure management.  We might consider creasing his Lotrel to 10-40 mg at some point in the future.    Orders:  -     Hypertension Digital Medicine (HDMP) Enrollment Order    4. Stage 3a chronic kidney disease  Overview:   Most recent GFR  58, 53 ml/min/1.73.  Monitored regularly.    Patient given the following recommendations:  -Follow low sodium diet (2 grams a day)   -Control high blood pressure ( goal BP < 130/80, please record BP at home every day and bring log to next office visit to assure that home cuff is calibrated at minimum every 12 months)   -Exercise at least 30 minutes a day, 5 days a week.   -Maintain healthy weight.   -Stay well hydrated   -Receive Pneumovax, Flu, and HBV vaccines if indicated.   -Do not take NSAIDs (Ibuprofen, Naproxen, Aleve, Advil, Toradol, Mobic), may take only Tylenol as needed for pain/headaches.   -Take cholesterol-lowering medications as prescribed (LDL goal <100)    Orders:  -     Comprehensive Metabolic Panel; Future; Expected date: 12/18/2024    5. Dyslipidemia  Overview:  Prescribed Crestor 10 mg daily.    Most recent cholesterol/lipid blood testing shows adequate control, continue current treatment plan, including prescription medications if prescribed, and/or diet high in fiber, low in saturated fats as discussed during clinic visit.    Medication will be continued at current dosage.      6. Macrocytic anemia  Overview:  Patient has a diagnosis of anemia, etiology uncertain.  Anemia has been mild and asymptomatic.  We will continue to monitor regularly.  If anemia changes in any significant way, or if any significant symptoms develop which could be attributed to the anemia, more aggressive evaluation, treatment, and possibly referral will be considered.      7. Chronic pain of left knee  Overview:  Prescribed Celebrex 200 mg  b.i.d. p.r.n. pain.    Patient has a diagnosis of osteoarthritis.  This is a chronic inflammatory joint condition.  Suggestions for treatment of this condition are:  -using medications as needed to control inflammation and pain, this would include either NSAID medications or Tylenol  -maintaining a good healthy amount of regular activity  -maintaining a healthy weight  -it may be helpful to start a supplement containing glucosamine, chondroitin, tumeric, and MSM (methylsulfonlmethane).  This is a dietary supplement which should have a positive effect on symptoms consistent with osteoarthritis  -in some instances, depending on the joint affected, intra-articular start injections can be helpful      8. Primary osteoarthritis of left knee  Overview:  Prescribed Celebrex 20 mg b.i.d. p.r.n..    Patient has a diagnosis of osteoarthritis.  This is a chronic inflammatory joint condition.  Suggestions for treatment of this condition are:  -using medications as needed to control inflammation and pain, this would include either NSAID medications or Tylenol  -maintaining a good healthy amount of regular activity  -maintaining a healthy weight  -it may be helpful to start a supplement containing glucosamine, chondroitin, tumeric, and MSM (methylsulfonlmethane).  This is a dietary supplement which should have a positive effect on symptoms consistent with osteoarthritis  -in some instances, depending on the joint affected, intra-articular start injections can be helpful      9. Class 1 obesity due to excess calories with serious comorbidity and body mass index (BMI) of 31.0 to 31.9 in adult  Overview:  Weight loss, healthy dietary choices, increased activity/exercise are recommended.  To lose weight, it is generally recommended to restrict calories to approximately 1500 calories per day to lose 1 lb per week, and approximately 1200 calories per day to lose up to 2 lb per week.  Generally, this is a healthy amount of weight to  "lose, and an appropriate amount of time to lose this amount of weight.  Adding exercise will assist in losing weight, particularly aerobic exercise such as walking.  However, resistance training, or lifting weights" over time may assistant weight loss by increasing basal metabolic rate, or the rate at which your body burns calories during periods of inactivity.    Keep track of BMI (body mass index), below 25 is considered normal, over 25 but below 30 is considered overweight, anything over 30 is considered moderately obese, over 35 severely obese, and over 40 is characterized as morbidly obese.              Advance Care Planning     Date: 12/17/2024    Living Will  During this visit, I engaged the patient  in the voluntary advance care planning process.  The patient and I reviewed the role for advance directives and their purpose in directing future healthcare if the patient's unable to speak for him/herself.  At this point in time, the patient does have full decision-making capacity.  We discussed different extreme health states that he could experience, and reviewed what kind of medical care he would want in those situations.  The patient communicated that if he were comatose and had little chance of a meaningful recovery, he would not want machines/life-sustaining treatments used. In addition to the above preference, other important end-of-life issues for the patient include no other issues.  Advanced care planning paperwork given to patient to bring home and discuss with the family.  Once signed, patient should bring form back for for the purposes of entering it into the medical record.  I spent a total of 5 minutes engaging the patient in this advance care planning discussion.              Current Outpatient Medications   Medication Instructions    amLODIPine-benazepriL (LOTREL) 5-40 mg per capsule 1 capsule, Oral, Daily    rosuvastatin (CRESTOR) 10 mg, Oral, Daily        Follow up in about 6 months (around " 6/18/2025) for Chronic condition F/up. In addition to their scheduled follow up, the patient has also been instructed to follow up on as needed basis.     This note was created with the assistance of [employer] voice recognition software or phone dictation. There may be transcription errors as a result of using this technology. Effort has been made to assure accuracy of transcription but any obvious errors or omissions should be clarified with the author of the document.

## 2024-12-18 ENCOUNTER — OFFICE VISIT (OUTPATIENT)
Dept: PRIMARY CARE CLINIC | Facility: CLINIC | Age: 64
End: 2024-12-18
Payer: MEDICARE

## 2024-12-18 VITALS
HEART RATE: 70 BPM | RESPIRATION RATE: 18 BRPM | OXYGEN SATURATION: 99 % | DIASTOLIC BLOOD PRESSURE: 76 MMHG | WEIGHT: 221 LBS | BODY MASS INDEX: 31.64 KG/M2 | HEIGHT: 70 IN | SYSTOLIC BLOOD PRESSURE: 138 MMHG

## 2024-12-18 DIAGNOSIS — E66.09 CLASS 1 OBESITY DUE TO EXCESS CALORIES WITH SERIOUS COMORBIDITY AND BODY MASS INDEX (BMI) OF 31.0 TO 31.9 IN ADULT: Chronic | ICD-10-CM

## 2024-12-18 DIAGNOSIS — D53.9 MACROCYTIC ANEMIA: Chronic | ICD-10-CM

## 2024-12-18 DIAGNOSIS — Z12.5 SCREENING FOR PROSTATE CANCER: ICD-10-CM

## 2024-12-18 DIAGNOSIS — M17.12 PRIMARY OSTEOARTHRITIS OF LEFT KNEE: ICD-10-CM

## 2024-12-18 DIAGNOSIS — M25.562 CHRONIC PAIN OF LEFT KNEE: Chronic | ICD-10-CM

## 2024-12-18 DIAGNOSIS — N18.31 STAGE 3A CHRONIC KIDNEY DISEASE: ICD-10-CM

## 2024-12-18 DIAGNOSIS — Z00.00 MEDICARE ANNUAL WELLNESS VISIT, SUBSEQUENT: Primary | ICD-10-CM

## 2024-12-18 DIAGNOSIS — E78.5 DYSLIPIDEMIA: Chronic | ICD-10-CM

## 2024-12-18 DIAGNOSIS — G89.29 CHRONIC PAIN OF LEFT KNEE: Chronic | ICD-10-CM

## 2024-12-18 DIAGNOSIS — I10 PRIMARY HYPERTENSION: Chronic | ICD-10-CM

## 2024-12-18 DIAGNOSIS — E66.811 CLASS 1 OBESITY DUE TO EXCESS CALORIES WITH SERIOUS COMORBIDITY AND BODY MASS INDEX (BMI) OF 31.0 TO 31.9 IN ADULT: Chronic | ICD-10-CM

## 2024-12-18 PROBLEM — B02.9 HERPES ZOSTER WITHOUT COMPLICATION: Status: RESOLVED | Noted: 2022-05-12 | Resolved: 2024-12-18

## 2024-12-27 ENCOUNTER — PATIENT MESSAGE (OUTPATIENT)
Dept: PRIMARY CARE CLINIC | Facility: CLINIC | Age: 64
End: 2024-12-27
Payer: MEDICARE

## 2025-01-23 ENCOUNTER — PATIENT MESSAGE (OUTPATIENT)
Dept: PRIMARY CARE CLINIC | Facility: CLINIC | Age: 65
End: 2025-01-23
Payer: MEDICARE

## 2025-05-08 ENCOUNTER — PATIENT MESSAGE (OUTPATIENT)
Dept: PRIMARY CARE CLINIC | Facility: CLINIC | Age: 65
End: 2025-05-08
Payer: MEDICARE

## 2025-05-08 DIAGNOSIS — T75.3XXA MOTION SICKNESS, INITIAL ENCOUNTER: Primary | ICD-10-CM

## 2025-05-08 RX ORDER — ONDANSETRON 8 MG/1
8 TABLET, ORALLY DISINTEGRATING ORAL EVERY 6 HOURS PRN
Qty: 12 TABLET | Refills: 3 | Status: SHIPPED | OUTPATIENT
Start: 2025-05-08